# Patient Record
Sex: FEMALE | Race: WHITE | NOT HISPANIC OR LATINO | Employment: FULL TIME | URBAN - METROPOLITAN AREA
[De-identification: names, ages, dates, MRNs, and addresses within clinical notes are randomized per-mention and may not be internally consistent; named-entity substitution may affect disease eponyms.]

---

## 2017-01-24 ENCOUNTER — OFFICE VISIT (OUTPATIENT)
Dept: FAMILY MEDICINE CLINIC | Facility: CLINIC | Age: 57
End: 2017-01-24

## 2017-01-24 VITALS
SYSTOLIC BLOOD PRESSURE: 126 MMHG | OXYGEN SATURATION: 99 % | HEART RATE: 66 BPM | BODY MASS INDEX: 26.84 KG/M2 | TEMPERATURE: 97.9 F | WEIGHT: 171 LBS | DIASTOLIC BLOOD PRESSURE: 80 MMHG | HEIGHT: 67 IN

## 2017-01-24 DIAGNOSIS — J45.30 MILD PERSISTENT ASTHMA WITHOUT COMPLICATION: ICD-10-CM

## 2017-01-24 PROCEDURE — 99213 OFFICE O/P EST LOW 20 MIN: CPT | Performed by: NURSE PRACTITIONER

## 2017-01-24 RX ORDER — ALBUTEROL SULFATE 90 UG/1
2 AEROSOL, METERED RESPIRATORY (INHALATION) EVERY 4 HOURS PRN
Qty: 1 INHALER | Refills: 11 | Status: SHIPPED | OUTPATIENT
Start: 2017-01-24 | End: 2018-01-17 | Stop reason: SDUPTHER

## 2017-01-24 RX ORDER — MONTELUKAST SODIUM 10 MG/1
TABLET ORAL
Qty: 30 TABLET | Refills: 11 | Status: SHIPPED | OUTPATIENT
Start: 2017-01-24 | End: 2017-06-26

## 2017-01-24 NOTE — PROGRESS NOTES
Subjective   Cheri Cross is a 56 y.o. female.     History of Present Illness   Patient presents to office to discuss asthma medications.  Patient has previously been well controlled and used albuterol inhaler only occasionally. Patient reports she has been needing to use it daily for the past few weeks.  She reports feeling as if she is unable to get deep breath.  Reports some mild dry cough that is worse at bedtime.  Denies mucus.  She was previously taking ventolin but insurance prefers proair.    The following portions of the patient's history were reviewed and updated as appropriate: allergies, current medications, past family history, past medical history, past social history, past surgical history and problem list.    Review of Systems   Constitutional: Negative for chills and fever.   HENT: Negative for congestion, sinus pressure and sore throat.    Respiratory: Positive for cough, chest tightness and shortness of breath. Negative for wheezing.        Objective   Physical Exam   Constitutional: She is oriented to person, place, and time. She appears well-developed and well-nourished.   HENT:   Head: Normocephalic and atraumatic.   Cardiovascular: Normal rate and regular rhythm.    Pulmonary/Chest: Effort normal and breath sounds normal.   Neurological: She is alert and oriented to person, place, and time.   Skin: Skin is warm and dry.   Psychiatric: She has a normal mood and affect. Judgment normal.   Vitals reviewed.      Assessment/Plan   Cheri was seen today for asthma.    Diagnoses and all orders for this visit:    Mild persistent asthma without complication  -     albuterol (PROVENTIL HFA;VENTOLIN HFA) 108 (90 BASE) MCG/ACT inhaler; Inhale 2 puffs Every 4 (Four) Hours As Needed for wheezing.  -     montelukast (SINGULAIR) 10 MG tablet; One PO daily  -     Fluticasone Furoate-Vilanterol 100-25 MCG/INH aerosol powder ; Inhale 1 puff Daily.

## 2017-01-24 NOTE — MR AVS SNAPSHOT
Cheri Cross   1/24/2017 10:30 AM   Office Visit    Provider:  JOSHUA Trejo   Department:  [facility]   Dept Phone:  [phone]                Your Full Care Plan              Today's Medication Changes          These changes are accurate as of: 1/24/17 11:01 AM.  If you have any questions, ask your nurse or doctor.               New Medication(s)Ordered:     Fluticasone Furoate-Vilanterol 100-25 MCG/INH aerosol powder    Inhale 1 puff Daily.   Started by:  JOSHUA Trejo       montelukast 10 MG tablet   Commonly known as:  SINGULAIR   One PO daily   Started by:  JOSHUA Trejo            Where to Get Your Medications      These medications were sent to [facility] [id number] [place] [address] [id number] AT [facility] - [phone]  - [phone]   [id number], [facility] [id number]    Hours:  24-hours Phone:  [phone]     albuterol 108 (90 BASE) MCG/ACT inhaler    Fluticasone Furoate-Vilanterol 100-25 MCG/INH aerosol powder     montelukast 10 MG tablet                  Your Updated Medication List          This list is accurate as of: 1/24/17 11:01 AM.  Always use your most recent med list.                albuterol 108 (90 BASE) MCG/ACT inhaler   Commonly known as:  PROVENTIL HFA;VENTOLIN HFA   Inhale 2 puffs Every 4 (Four) Hours As Needed for wheezing.       CALCIUM 500 PO       estradiol 2 MG tablet   Commonly known as:  ESTRACE       Fluticasone Furoate-Vilanterol 100-25 MCG/INH aerosol powder    Inhale 1 puff Daily.       furosemide 20 MG tablet   Commonly known as:  LASIX   Take 1 tablet by mouth Daily.       hydrOXYzine 25 MG tablet   Commonly known as:  ATARAX   Take 1 tablet by mouth 3 (Three) Times a Day As Needed for anxiety.       meclizine 25 MG tablet   Commonly known as:  ANTIVERT       montelukast 10 MG tablet   Commonly  "known as:  SINGULAIR   One PO daily       omeprazole 40 MG capsule   Commonly known as:  PRILOSEC   Take 1 capsule by mouth Daily.       pravastatin 40 MG tablet   Commonly known as:  PRAVACHOL   Take 1 tablet by mouth Daily.               You Were Diagnosed With        Codes Comments    Mild intermittent asthma without complication     ICD-10-CM: J45.20  ICD-9-CM: 493.90       Instructions     None    Patient Instructions History      [employer] Signup     [facility]bird allows you to send messages to your doctor, view your test results, renew your prescriptions, schedule appointments, and more. To sign up, go to [employer] and click on the Sign Up Now link in the New User? box. Enter your Catbird Activation Code exactly as it appears below along with the last four digits of your Social Security Number and your Date of Birth () to complete the sign-up process. If you do not sign up before the expiration date, you must request a new code.    Catbird Activation Code: [email or web address]  Expires: 2017 11:01 AM    If you have questions, you can email [email or web address] or call [phone] to talk to our Catbird staff. Remember, Catbird is NOT to be used for urgent needs. For medical emergencies, dial 911.               Other Info from Your Visit           Allergies     Nickel        Reason for Visit     Asthma 2 wks  using inhaler more than normal.      Vital Signs     Blood Pressure Pulse Temperature Height Weight Oxygen Saturation    126/80 66 97.9 °F (36.6 °C) 67\" (170.2 cm) 171 lb (77.6 kg) 99%    Body Mass Index Smoking Status                26.78 kg/m2 Never Smoker          Problems and Diagnoses Noted     Asthma      "

## 2017-05-21 DIAGNOSIS — E78.2 MIXED HYPERLIPIDEMIA: ICD-10-CM

## 2017-05-22 RX ORDER — PRAVASTATIN SODIUM 40 MG
TABLET ORAL
Qty: 30 TABLET | Refills: 0 | Status: SHIPPED | OUTPATIENT
Start: 2017-05-22 | End: 2017-06-14 | Stop reason: SDUPTHER

## 2017-06-14 DIAGNOSIS — R60.9 EDEMA, UNSPECIFIED TYPE: ICD-10-CM

## 2017-06-14 DIAGNOSIS — E78.2 MIXED HYPERLIPIDEMIA: ICD-10-CM

## 2017-06-14 RX ORDER — FUROSEMIDE 20 MG/1
TABLET ORAL
Qty: 30 TABLET | Refills: 0 | Status: SHIPPED | OUTPATIENT
Start: 2017-06-14 | End: 2017-06-26 | Stop reason: SDUPTHER

## 2017-06-14 RX ORDER — PRAVASTATIN SODIUM 40 MG
TABLET ORAL
Qty: 30 TABLET | Refills: 0 | Status: SHIPPED | OUTPATIENT
Start: 2017-06-14 | End: 2017-06-26 | Stop reason: SDUPTHER

## 2017-06-26 ENCOUNTER — OFFICE VISIT (OUTPATIENT)
Dept: FAMILY MEDICINE CLINIC | Facility: CLINIC | Age: 57
End: 2017-06-26

## 2017-06-26 VITALS
TEMPERATURE: 98.1 F | HEIGHT: 67 IN | DIASTOLIC BLOOD PRESSURE: 70 MMHG | OXYGEN SATURATION: 98 % | HEART RATE: 83 BPM | WEIGHT: 167 LBS | BODY MASS INDEX: 26.21 KG/M2 | SYSTOLIC BLOOD PRESSURE: 114 MMHG

## 2017-06-26 DIAGNOSIS — E04.9 GOITER: Primary | ICD-10-CM

## 2017-06-26 DIAGNOSIS — E78.2 MIXED HYPERLIPIDEMIA: ICD-10-CM

## 2017-06-26 DIAGNOSIS — R60.9 EDEMA, UNSPECIFIED TYPE: ICD-10-CM

## 2017-06-26 PROCEDURE — 99214 OFFICE O/P EST MOD 30 MIN: CPT | Performed by: NURSE PRACTITIONER

## 2017-06-26 RX ORDER — PRAVASTATIN SODIUM 40 MG
40 TABLET ORAL DAILY
Qty: 90 TABLET | Refills: 1 | Status: SHIPPED | OUTPATIENT
Start: 2017-06-26 | End: 2018-01-17 | Stop reason: SDUPTHER

## 2017-06-26 RX ORDER — FUROSEMIDE 20 MG/1
20 TABLET ORAL DAILY
Qty: 90 TABLET | Refills: 1 | Status: SHIPPED | OUTPATIENT
Start: 2017-06-26 | End: 2018-01-17 | Stop reason: SDUPTHER

## 2017-06-26 NOTE — PROGRESS NOTES
"Subjective   Cheri Cross is a 56 y.o. female.     History of Present Illness   Chief Complaint:   Chief Complaint   Patient presents with   • Hyperlipidemia     med refill   • Neck Pain     1 mo, tender to touch       Cheri Cross 56 y.o. female who presents today for Medical Management of the below listed issues and medication refills.  she has a history of   Patient Active Problem List   Diagnosis   • Syncope and collapse   • Ventricular tachycardia   • Asthma   • Hyperlipidemia   • Hypothyroid   .  Since the last visit, she has overall felt well.  she has been compliant with   Current Outpatient Prescriptions:   •  furosemide (LASIX) 20 MG tablet, Take 1 tablet by mouth Daily., Disp: 90 tablet, Rfl: 1  •  pravastatin (PRAVACHOL) 40 MG tablet, Take 1 tablet by mouth Daily., Disp: 90 tablet, Rfl: 1  •  albuterol (PROVENTIL HFA;VENTOLIN HFA) 108 (90 BASE) MCG/ACT inhaler, Inhale 2 puffs Every 4 (Four) Hours As Needed for wheezing., Disp: 1 inhaler, Rfl: 11  •  Calcium-Magnesium-Vitamin D (CALCIUM 500 PO), Take 600 mg by mouth 3 (three) times a day., Disp: , Rfl:   •  estradiol (ESTRACE) 2 MG tablet, , Disp: , Rfl: 3.  she denies medication side effects.    All of the chronic condition(s) listed above are stable w/o issues.    /70  Pulse 83  Temp 98.1 °F (36.7 °C)  Ht 67\" (170.2 cm)  Wt 167 lb (75.8 kg)  SpO2 98%  BMI 26.16 kg/m2    Results for orders placed or performed in visit on 10/20/16   Comprehensive metabolic panel   Result Value Ref Range    Glucose 97 65 - 99 mg/dL    BUN 15 6 - 20 mg/dL    Creatinine 0.72 0.57 - 1.00 mg/dL    eGFR Non African Am 84 >60 mL/min/1.73    eGFR African Am 102 >60 mL/min/1.73    BUN/Creatinine Ratio 20.8 7.0 - 25.0    Sodium 141 136 - 145 mmol/L    Potassium 4.8 3.5 - 5.2 mmol/L    Chloride 101 98 - 107 mmol/L    Total CO2 26.8 22.0 - 29.0 mmol/L    Calcium 9.5 8.6 - 10.5 mg/dL    Total Protein 6.5 6.0 - 8.5 g/dL    Albumin 4.20 3.50 - 5.20 g/dL    Globulin 2.3 " gm/dL    A/G Ratio 1.8 g/dL    Total Bilirubin 0.4 0.1 - 1.2 mg/dL    Alkaline Phosphatase 103 39 - 117 U/L    AST (SGOT) 15 1 - 32 U/L    ALT (SGPT) 15 1 - 33 U/L   Lipid Panel With LDL/HDL Ratio   Result Value Ref Range    Total Cholesterol 226 (H) 0 - 200 mg/dL    Triglycerides 91 0 - 150 mg/dL    HDL Cholesterol 80 (H) 40 - 60 mg/dL    VLDL Cholesterol 18.2 5 - 40 mg/dL    LDL Cholesterol  128 (H) 0 - 100 mg/dL    LDL/HDL Ratio 1.60    TSH   Result Value Ref Range    TSH 2.800 0.270 - 4.200 mIU/mL   CBC and Differential   Result Value Ref Range    WBC 4.69 4.50 - 10.70 10*3/mm3    RBC 4.56 3.90 - 5.20 10*6/mm3    Hemoglobin 13.6 11.9 - 15.5 g/dL    Hematocrit 42.2 35.6 - 45.5 %    MCV 92.5 80.5 - 98.2 fL    MCH 29.8 26.9 - 32.0 pg    MCHC 32.2 (L) 32.4 - 36.3 g/dL    RDW 13.1 (H) 11.7 - 13.0 %    Platelets 258 140 - 500 10*3/mm3    Neutrophil Rel % 48.2 42.7 - 76.0 %    Lymphocyte Rel % 44.3 19.6 - 45.3 %    Monocyte Rel % 7.5 5.0 - 12.0 %    Eosinophil Rel % 0.0 (L) 0.3 - 6.2 %    Basophil Rel % 0.0 0.0 - 1.5 %    Neutrophils Absolute 2.26 1.90 - 8.10 10*3/mm3    Lymphocytes Absolute 2.08 0.90 - 4.80 10*3/mm3    Monocytes Absolute 0.35 0.20 - 1.20 10*3/mm3    Eosinophils Absolute 0.00 0.00 - 0.70 10*3/mm3    Basophils Absolute 0.00 0.00 - 0.20 10*3/mm3    Immature Granulocyte Rel % 0.0 0.0 - 0.5 %    Immature Grans Absolute 0.00 0.00 - 0.03 10*3/mm3     Patient also reports some swelling and tenderness to anterior neck area.  Has noticed symptoms for about a month.  Denies injury or stiffness to neck.  Denies URI symptoms.   The following portions of the patient's history were reviewed and updated as appropriate: allergies, current medications, past family history, past medical history, past social history, past surgical history and problem list.    Review of Systems   Constitutional: Negative for unexpected weight change.   Respiratory: Negative for shortness of breath.    Cardiovascular: Negative for chest  pain and palpitations.   Psychiatric/Behavioral: Negative for behavioral problems.       Objective   Physical Exam   Constitutional: She is oriented to person, place, and time. She appears well-developed and well-nourished.   HENT:   Right Ear: Tympanic membrane, external ear and ear canal normal.   Left Ear: Tympanic membrane, external ear and ear canal normal.   Nose: Right sinus exhibits no maxillary sinus tenderness and no frontal sinus tenderness. Left sinus exhibits no maxillary sinus tenderness and no frontal sinus tenderness.   Mouth/Throat: Uvula is midline and oropharynx is clear and moist.   Neck: Carotid bruit is not present. Thyromegaly present.   Cardiovascular: Normal rate and regular rhythm.    Pulmonary/Chest: Effort normal and breath sounds normal.   Neurological: She is alert and oriented to person, place, and time.   Skin: Skin is warm.   Psychiatric: She has a normal mood and affect. Judgment normal.   Nursing note and vitals reviewed.      Assessment/Plan   Cheri was seen today for hyperlipidemia and neck pain.    Diagnoses and all orders for this visit:    Goiter  -     Ambulatory Referral to ENT (Otolaryngology)  -     T4  -     TSH  -     T3  -     US thyroid    Edema, unspecified type  -     furosemide (LASIX) 20 MG tablet; Take 1 tablet by mouth Daily.    Mixed hyperlipidemia  -     pravastatin (PRAVACHOL) 40 MG tablet; Take 1 tablet by mouth Daily.

## 2017-06-27 LAB
T3 SERPL-MCNC: 367.5 NG/DL (ref 80–200)
T4 SERPL-MCNC: >24.9 MCG/DL (ref 4.5–11.7)
TSH SERPL DL<=0.005 MIU/L-ACNC: 0.02 MIU/ML (ref 0.27–4.2)

## 2018-01-08 DIAGNOSIS — R60.9 EDEMA, UNSPECIFIED TYPE: ICD-10-CM

## 2018-01-09 RX ORDER — FUROSEMIDE 20 MG/1
TABLET ORAL
Qty: 90 TABLET | Refills: 0 | OUTPATIENT
Start: 2018-01-09

## 2018-01-17 ENCOUNTER — OFFICE VISIT (OUTPATIENT)
Dept: FAMILY MEDICINE CLINIC | Facility: CLINIC | Age: 58
End: 2018-01-17

## 2018-01-17 VITALS
BODY MASS INDEX: 27.15 KG/M2 | HEART RATE: 63 BPM | SYSTOLIC BLOOD PRESSURE: 120 MMHG | HEIGHT: 67 IN | TEMPERATURE: 98.1 F | RESPIRATION RATE: 18 BRPM | DIASTOLIC BLOOD PRESSURE: 78 MMHG | WEIGHT: 173 LBS | OXYGEN SATURATION: 97 %

## 2018-01-17 DIAGNOSIS — Z12.11 ENCOUNTER FOR COLORECTAL CANCER SCREENING: ICD-10-CM

## 2018-01-17 DIAGNOSIS — R60.9 EDEMA, UNSPECIFIED TYPE: ICD-10-CM

## 2018-01-17 DIAGNOSIS — Z12.12 ENCOUNTER FOR COLORECTAL CANCER SCREENING: ICD-10-CM

## 2018-01-17 DIAGNOSIS — J45.30 MILD PERSISTENT ASTHMA WITHOUT COMPLICATION: ICD-10-CM

## 2018-01-17 DIAGNOSIS — E78.2 MIXED HYPERLIPIDEMIA: Primary | ICD-10-CM

## 2018-01-17 LAB
ALBUMIN SERPL-MCNC: 4.1 G/DL (ref 3.5–5.2)
ALBUMIN/GLOB SERPL: 1.3 G/DL
ALP SERPL-CCNC: 113 U/L (ref 39–117)
ALT SERPL-CCNC: 14 U/L (ref 1–33)
AST SERPL-CCNC: 16 U/L (ref 1–32)
BASOPHILS # BLD AUTO: 0.02 10*3/MM3 (ref 0–0.2)
BASOPHILS NFR BLD AUTO: 0.3 % (ref 0–1.5)
BILIRUB SERPL-MCNC: 0.3 MG/DL (ref 0.1–1.2)
BUN SERPL-MCNC: 21 MG/DL (ref 6–20)
BUN/CREAT SERPL: 25.6 (ref 7–25)
CALCIUM SERPL-MCNC: 9.2 MG/DL (ref 8.6–10.5)
CHLORIDE SERPL-SCNC: 100 MMOL/L (ref 98–107)
CHOLEST SERPL-MCNC: 253 MG/DL (ref 0–200)
CO2 SERPL-SCNC: 30.4 MMOL/L (ref 22–29)
CREAT SERPL-MCNC: 0.82 MG/DL (ref 0.57–1)
EOSINOPHIL # BLD AUTO: 0 10*3/MM3 (ref 0–0.7)
EOSINOPHIL NFR BLD AUTO: 0 % (ref 0.3–6.2)
ERYTHROCYTE [DISTWIDTH] IN BLOOD BY AUTOMATED COUNT: 12.9 % (ref 11.7–13)
GLOBULIN SER CALC-MCNC: 3.1 GM/DL
GLUCOSE SERPL-MCNC: 87 MG/DL (ref 65–99)
HCT VFR BLD AUTO: 42.7 % (ref 35.6–45.5)
HDLC SERPL-MCNC: 83 MG/DL (ref 40–60)
HGB BLD-MCNC: 13.8 G/DL (ref 11.9–15.5)
IMM GRANULOCYTES # BLD: 0 10*3/MM3 (ref 0–0.03)
IMM GRANULOCYTES NFR BLD: 0 % (ref 0–0.5)
LDLC SERPL CALC-MCNC: 145 MG/DL (ref 0–100)
LYMPHOCYTES # BLD AUTO: 2.88 10*3/MM3 (ref 0.9–4.8)
LYMPHOCYTES NFR BLD AUTO: 41.3 % (ref 19.6–45.3)
MCH RBC QN AUTO: 30.1 PG (ref 26.9–32)
MCHC RBC AUTO-ENTMCNC: 32.3 G/DL (ref 32.4–36.3)
MCV RBC AUTO: 93 FL (ref 80.5–98.2)
MONOCYTES # BLD AUTO: 0.35 10*3/MM3 (ref 0.2–1.2)
MONOCYTES NFR BLD AUTO: 5 % (ref 5–12)
NEUTROPHILS # BLD AUTO: 3.72 10*3/MM3 (ref 1.9–8.1)
NEUTROPHILS NFR BLD AUTO: 53.4 % (ref 42.7–76)
PLATELET # BLD AUTO: 282 10*3/MM3 (ref 140–500)
POTASSIUM SERPL-SCNC: 4.1 MMOL/L (ref 3.5–5.2)
PROT SERPL-MCNC: 7.2 G/DL (ref 6–8.5)
RBC # BLD AUTO: 4.59 10*6/MM3 (ref 3.9–5.2)
SODIUM SERPL-SCNC: 141 MMOL/L (ref 136–145)
TRIGL SERPL-MCNC: 126 MG/DL (ref 0–150)
VLDLC SERPL CALC-MCNC: 25.2 MG/DL (ref 5–40)
WBC # BLD AUTO: 6.97 10*3/MM3 (ref 4.5–10.7)

## 2018-01-17 PROCEDURE — 99214 OFFICE O/P EST MOD 30 MIN: CPT | Performed by: NURSE PRACTITIONER

## 2018-01-17 RX ORDER — FUROSEMIDE 20 MG/1
20 TABLET ORAL DAILY
Qty: 90 TABLET | Refills: 1 | Status: SHIPPED | OUTPATIENT
Start: 2018-01-17 | End: 2018-07-13 | Stop reason: SDUPTHER

## 2018-01-17 RX ORDER — PRAVASTATIN SODIUM 40 MG
40 TABLET ORAL DAILY
Qty: 90 TABLET | Refills: 1 | Status: SHIPPED | OUTPATIENT
Start: 2018-01-17 | End: 2018-07-13 | Stop reason: SDUPTHER

## 2018-01-17 RX ORDER — PROPRANOLOL HYDROCHLORIDE 40 MG/1
TABLET ORAL
Refills: 3 | COMMUNITY
Start: 2017-12-18 | End: 2019-07-25

## 2018-01-17 RX ORDER — METHIMAZOLE 5 MG/1
TABLET ORAL
Refills: 3 | COMMUNITY
Start: 2018-01-08 | End: 2019-07-25

## 2018-01-17 RX ORDER — METHIMAZOLE 10 MG/1
TABLET ORAL
Refills: 3 | COMMUNITY
Start: 2017-12-18 | End: 2018-01-17 | Stop reason: ALTCHOICE

## 2018-01-17 RX ORDER — ALBUTEROL SULFATE 90 UG/1
2 AEROSOL, METERED RESPIRATORY (INHALATION) EVERY 4 HOURS PRN
Qty: 1 INHALER | Refills: 11 | Status: SHIPPED | OUTPATIENT
Start: 2018-01-17 | End: 2020-05-01 | Stop reason: SDUPTHER

## 2018-01-17 NOTE — PROGRESS NOTES
Subjective   Cheri Cross is a 57 y.o. female.     History of Present Illness   Cheri Cross 57 y.o. female who presents today for routine follow up check and medication refills.  she has a history of   Patient Active Problem List   Diagnosis   • Syncope and collapse   • Ventricular tachycardia   • Asthma   • Hyperlipidemia   • Hyperthyroidism   .  Since the last visit, she has overall felt well.  She has Hyperlipidemia and here to discuss treatment and Hypothyroidism and will need to update labs for continued treatment.  she has been compliant with current medications have reviewed them.  The patient denies medication side effects.    Results for orders placed or performed in visit on 06/26/17   T4   Result Value Ref Range    T4, Total >24.90 (H) 4.50 - 11.70 mcg/dL   TSH   Result Value Ref Range    TSH 0.017 (L) 0.270 - 4.200 mIU/mL   T3   Result Value Ref Range    T3, Total 367.5 (H) 80.0 - 200.0 ng/dL     Patient is seeing Dr. Beckwith for hyperthyroidism. Is taking tapazole 2.5 mg BID.  Is due to f/u 2/21 for repeat labs.  Medication was adjusted a week ago.     Reports rarely using albuterol inhaler.    The following portions of the patient's history were reviewed and updated as appropriate: allergies, current medications, past family history, past medical history, past social history, past surgical history and problem list.    Review of Systems   Constitutional: Negative for fatigue and unexpected weight change.   Respiratory: Negative for shortness of breath.    Cardiovascular: Negative for chest pain and palpitations.   Endocrine: Negative for cold intolerance, heat intolerance, polydipsia, polyphagia and polyuria.   Psychiatric/Behavioral: Negative for behavioral problems.       Objective   Physical Exam   Constitutional: She is oriented to person, place, and time. She appears well-developed and well-nourished.   Neck: Carotid bruit is not present.   Cardiovascular: Normal rate and regular rhythm.     Pulmonary/Chest: Effort normal and breath sounds normal.   Neurological: She is alert and oriented to person, place, and time.   Psychiatric: She has a normal mood and affect. Judgment normal.   Nursing note and vitals reviewed.      Assessment/Plan   Cheri was seen today for hyperlipidemia and asthma.    Diagnoses and all orders for this visit:    Mixed hyperlipidemia  -     Comprehensive metabolic panel  -     Lipid panel  -     CBC and Differential  -     pravastatin (PRAVACHOL) 40 MG tablet; Take 1 tablet by mouth Daily.    Mild persistent asthma without complication  -     albuterol (PROVENTIL HFA;VENTOLIN HFA) 108 (90 Base) MCG/ACT inhaler; Inhale 2 puffs Every 4 (Four) Hours As Needed for Wheezing.    Edema, unspecified type  -     furosemide (LASIX) 20 MG tablet; Take 1 tablet by mouth Daily.    Encounter for colorectal cancer screening  -     Cologuard - Stool, Per Rectum

## 2018-03-07 DIAGNOSIS — F41.9 ANXIETY: ICD-10-CM

## 2018-03-08 RX ORDER — HYDROXYZINE HYDROCHLORIDE 25 MG/1
25 TABLET, FILM COATED ORAL 3 TIMES DAILY PRN
Qty: 90 TABLET | Refills: 0 | Status: SHIPPED | OUTPATIENT
Start: 2018-03-08 | End: 2019-07-25

## 2018-07-13 DIAGNOSIS — E78.2 MIXED HYPERLIPIDEMIA: ICD-10-CM

## 2018-07-13 DIAGNOSIS — R60.9 EDEMA, UNSPECIFIED TYPE: ICD-10-CM

## 2018-07-16 RX ORDER — FUROSEMIDE 20 MG/1
20 TABLET ORAL DAILY
Qty: 30 TABLET | Refills: 0 | Status: SHIPPED | OUTPATIENT
Start: 2018-07-16 | End: 2018-07-24 | Stop reason: SDUPTHER

## 2018-07-16 RX ORDER — PRAVASTATIN SODIUM 40 MG
40 TABLET ORAL DAILY
Qty: 30 TABLET | Refills: 0 | Status: SHIPPED | OUTPATIENT
Start: 2018-07-16 | End: 2018-07-24 | Stop reason: SDUPTHER

## 2018-07-24 ENCOUNTER — OFFICE VISIT (OUTPATIENT)
Dept: FAMILY MEDICINE CLINIC | Facility: CLINIC | Age: 58
End: 2018-07-24

## 2018-07-24 VITALS
HEIGHT: 67 IN | WEIGHT: 173 LBS | OXYGEN SATURATION: 97 % | HEART RATE: 64 BPM | RESPIRATION RATE: 16 BRPM | DIASTOLIC BLOOD PRESSURE: 71 MMHG | BODY MASS INDEX: 27.15 KG/M2 | TEMPERATURE: 97.9 F | SYSTOLIC BLOOD PRESSURE: 131 MMHG

## 2018-07-24 DIAGNOSIS — E78.2 MIXED HYPERLIPIDEMIA: ICD-10-CM

## 2018-07-24 DIAGNOSIS — R60.9 EDEMA, UNSPECIFIED TYPE: ICD-10-CM

## 2018-07-24 PROCEDURE — 99214 OFFICE O/P EST MOD 30 MIN: CPT | Performed by: NURSE PRACTITIONER

## 2018-07-24 RX ORDER — FUROSEMIDE 20 MG/1
20 TABLET ORAL DAILY
Qty: 90 TABLET | Refills: 3 | Status: SHIPPED | OUTPATIENT
Start: 2018-07-24 | End: 2019-07-25 | Stop reason: SDUPTHER

## 2018-07-24 RX ORDER — PRAVASTATIN SODIUM 40 MG
40 TABLET ORAL DAILY
Qty: 90 TABLET | Refills: 3 | Status: SHIPPED | OUTPATIENT
Start: 2018-07-24 | End: 2019-07-25 | Stop reason: SDUPTHER

## 2018-07-24 RX ORDER — OMEPRAZOLE 40 MG/1
40 CAPSULE, DELAYED RELEASE ORAL DAILY
Qty: 90 CAPSULE | Refills: 1 | Status: SHIPPED | OUTPATIENT
Start: 2018-07-24 | End: 2019-05-02 | Stop reason: SDUPTHER

## 2018-07-24 NOTE — PROGRESS NOTES
Subjective   Cheri Cross is a 58 y.o. female.     History of Present Illness   Cheri Cross 58 y.o. female who presents today for routine follow up check and medication refills.  she has a history of   Patient Active Problem List   Diagnosis   • Syncope and collapse   • Ventricular tachycardia (CMS/HCC)   • Asthma   • Hyperlipidemia   • Hyperthyroidism   .  Since the last visit, she has overall felt well.  She has Hyperlipidemia and is well controlled on medication and lower extremity edema which is well controlled on lasix. .  she has been compliant with current medications have reviewed them.  The patient denies medication side effects.    Results for orders placed or performed in visit on 01/17/18   Comprehensive metabolic panel   Result Value Ref Range    Glucose 87 65 - 99 mg/dL    BUN 21 (H) 6 - 20 mg/dL    Creatinine 0.82 0.57 - 1.00 mg/dL    eGFR Non African Am 72 >60 mL/min/1.73    eGFR African Am 87 >60 mL/min/1.73    BUN/Creatinine Ratio 25.6 (H) 7.0 - 25.0    Sodium 141 136 - 145 mmol/L    Potassium 4.1 3.5 - 5.2 mmol/L    Chloride 100 98 - 107 mmol/L    Total CO2 30.4 (H) 22.0 - 29.0 mmol/L    Calcium 9.2 8.6 - 10.5 mg/dL    Total Protein 7.2 6.0 - 8.5 g/dL    Albumin 4.10 3.50 - 5.20 g/dL    Globulin 3.1 gm/dL    A/G Ratio 1.3 g/dL    Total Bilirubin 0.3 0.1 - 1.2 mg/dL    Alkaline Phosphatase 113 39 - 117 U/L    AST (SGOT) 16 1 - 32 U/L    ALT (SGPT) 14 1 - 33 U/L   Lipid panel   Result Value Ref Range    Total Cholesterol 253 (H) 0 - 200 mg/dL    Triglycerides 126 0 - 150 mg/dL    HDL Cholesterol 83 (H) 40 - 60 mg/dL    VLDL Cholesterol 25.2 5 - 40 mg/dL    LDL Cholesterol  145 (H) 0 - 100 mg/dL   CBC and Differential   Result Value Ref Range    WBC 6.97 4.50 - 10.70 10*3/mm3    RBC 4.59 3.90 - 5.20 10*6/mm3    Hemoglobin 13.8 11.9 - 15.5 g/dL    Hematocrit 42.7 35.6 - 45.5 %    MCV 93.0 80.5 - 98.2 fL    MCH 30.1 26.9 - 32.0 pg    MCHC 32.3 (L) 32.4 - 36.3 g/dL    RDW 12.9 11.7 - 13.0 %     Platelets 282 140 - 500 10*3/mm3    Neutrophil Rel % 53.4 42.7 - 76.0 %    Lymphocyte Rel % 41.3 19.6 - 45.3 %    Monocyte Rel % 5.0 5.0 - 12.0 %    Eosinophil Rel % 0.0 (L) 0.3 - 6.2 %    Basophil Rel % 0.3 0.0 - 1.5 %    Neutrophils Absolute 3.72 1.90 - 8.10 10*3/mm3    Lymphocytes Absolute 2.88 0.90 - 4.80 10*3/mm3    Monocytes Absolute 0.35 0.20 - 1.20 10*3/mm3    Eosinophils Absolute 0.00 0.00 - 0.70 10*3/mm3    Basophils Absolute 0.02 0.00 - 0.20 10*3/mm3    Immature Granulocyte Rel % 0.0 0.0 - 0.5 %    Immature Grans Absolute 0.00 0.00 - 0.03 10*3/mm3       The following portions of the patient's history were reviewed and updated as appropriate: allergies, current medications, past family history, past medical history, past social history, past surgical history and problem list.    Review of Systems   Constitutional: Negative for unexpected weight change.   Respiratory: Negative for shortness of breath.    Cardiovascular: Negative for chest pain and palpitations.   Endocrine: Negative for cold intolerance, heat intolerance, polydipsia, polyphagia and polyuria.   Psychiatric/Behavioral: Negative for behavioral problems.       Objective   Physical Exam   Constitutional: She is oriented to person, place, and time. She appears well-developed and well-nourished.   Cardiovascular: Normal rate and regular rhythm.    Pulmonary/Chest: Effort normal and breath sounds normal.   Neurological: She is alert and oriented to person, place, and time.   Psychiatric: She has a normal mood and affect. Judgment normal.   Nursing note and vitals reviewed.      Assessment/Plan   Cheri was seen today for hyperlipidemia.    Diagnoses and all orders for this visit:    Edema, unspecified type  -     furosemide (LASIX) 20 MG tablet; Take 1 tablet by mouth Daily.  -     Comprehensive metabolic panel  -     Lipid panel  -     CBC and Differential  -     TSH    Mixed hyperlipidemia  -     pravastatin (PRAVACHOL) 40 MG tablet; Take 1  tablet by mouth Daily.  -     Comprehensive metabolic panel  -     Lipid panel  -     CBC and Differential  -     TSH    Other orders  -     omeprazole (PRILOSEC) 40 MG capsule; Take 1 capsule by mouth Daily. One PO daily for GERD

## 2018-07-25 LAB
ALBUMIN SERPL-MCNC: 4 G/DL (ref 3.5–5.2)
ALBUMIN/GLOB SERPL: 1.7 G/DL
ALP SERPL-CCNC: 111 U/L (ref 39–117)
ALT SERPL-CCNC: 13 U/L (ref 1–33)
AST SERPL-CCNC: 18 U/L (ref 1–32)
BASOPHILS # BLD AUTO: 0.02 10*3/MM3 (ref 0–0.2)
BASOPHILS NFR BLD AUTO: 0.4 % (ref 0–1.5)
BILIRUB SERPL-MCNC: 0.3 MG/DL (ref 0.1–1.2)
BUN SERPL-MCNC: 17 MG/DL (ref 6–20)
BUN/CREAT SERPL: 20.5 (ref 7–25)
CALCIUM SERPL-MCNC: 9.1 MG/DL (ref 8.6–10.5)
CHLORIDE SERPL-SCNC: 102 MMOL/L (ref 98–107)
CHOLEST SERPL-MCNC: 203 MG/DL (ref 0–200)
CO2 SERPL-SCNC: 28.4 MMOL/L (ref 22–29)
CREAT SERPL-MCNC: 0.83 MG/DL (ref 0.57–1)
EOSINOPHIL # BLD AUTO: 0 10*3/MM3 (ref 0–0.7)
EOSINOPHIL NFR BLD AUTO: 0 % (ref 0.3–6.2)
ERYTHROCYTE [DISTWIDTH] IN BLOOD BY AUTOMATED COUNT: 13.7 % (ref 11.7–13)
GLOBULIN SER CALC-MCNC: 2.3 GM/DL
GLUCOSE SERPL-MCNC: 90 MG/DL (ref 65–99)
HCT VFR BLD AUTO: 43.4 % (ref 35.6–45.5)
HDLC SERPL-MCNC: 65 MG/DL (ref 40–60)
HGB BLD-MCNC: 13.2 G/DL (ref 11.9–15.5)
IMM GRANULOCYTES # BLD: 0 10*3/MM3 (ref 0–0.03)
IMM GRANULOCYTES NFR BLD: 0 % (ref 0–0.5)
LDLC SERPL CALC-MCNC: 115 MG/DL (ref 0–100)
LYMPHOCYTES # BLD AUTO: 2.18 10*3/MM3 (ref 0.9–4.8)
LYMPHOCYTES NFR BLD AUTO: 40.7 % (ref 19.6–45.3)
MCH RBC QN AUTO: 28.2 PG (ref 26.9–32)
MCHC RBC AUTO-ENTMCNC: 30.4 G/DL (ref 32.4–36.3)
MCV RBC AUTO: 92.7 FL (ref 80.5–98.2)
MONOCYTES # BLD AUTO: 0.3 10*3/MM3 (ref 0.2–1.2)
MONOCYTES NFR BLD AUTO: 5.6 % (ref 5–12)
NEUTROPHILS # BLD AUTO: 2.86 10*3/MM3 (ref 1.9–8.1)
NEUTROPHILS NFR BLD AUTO: 53.3 % (ref 42.7–76)
PLATELET # BLD AUTO: 283 10*3/MM3 (ref 140–500)
POTASSIUM SERPL-SCNC: 4.1 MMOL/L (ref 3.5–5.2)
PROT SERPL-MCNC: 6.3 G/DL (ref 6–8.5)
RBC # BLD AUTO: 4.68 10*6/MM3 (ref 3.9–5.2)
SODIUM SERPL-SCNC: 143 MMOL/L (ref 136–145)
TRIGL SERPL-MCNC: 114 MG/DL (ref 0–150)
TSH SERPL DL<=0.005 MIU/L-ACNC: 3.08 MIU/ML (ref 0.27–4.2)
VLDLC SERPL CALC-MCNC: 22.8 MG/DL (ref 5–40)
WBC # BLD AUTO: 5.36 10*3/MM3 (ref 4.5–10.7)

## 2018-09-18 ENCOUNTER — TRANSCRIBE ORDERS (OUTPATIENT)
Dept: ADMINISTRATIVE | Facility: HOSPITAL | Age: 58
End: 2018-09-18

## 2018-09-18 DIAGNOSIS — N64.4 PAIN OF LEFT BREAST: Primary | ICD-10-CM

## 2018-09-24 ENCOUNTER — HOSPITAL ENCOUNTER (OUTPATIENT)
Dept: MAMMOGRAPHY | Facility: HOSPITAL | Age: 58
Discharge: HOME | End: 2018-09-24
Attending: OBSTETRICS & GYNECOLOGY | Admitting: OBSTETRICS & GYNECOLOGY

## 2018-09-24 ENCOUNTER — HOSPITAL ENCOUNTER (OUTPATIENT)
Dept: ULTRASOUND IMAGING | Facility: HOSPITAL | Age: 58
Discharge: HOME | End: 2018-09-24

## 2018-09-24 DIAGNOSIS — N64.4 PAIN OF LEFT BREAST: ICD-10-CM

## 2018-09-24 PROCEDURE — 76641 ULTRASOUND BREAST COMPLETE: CPT

## 2018-09-24 PROCEDURE — 77066 DX MAMMO INCL CAD BI: CPT

## 2019-05-02 ENCOUNTER — TELEPHONE (OUTPATIENT)
Dept: FAMILY MEDICINE CLINIC | Facility: CLINIC | Age: 59
End: 2019-05-02

## 2019-05-02 DIAGNOSIS — K21.9 GASTROESOPHAGEAL REFLUX DISEASE, ESOPHAGITIS PRESENCE NOT SPECIFIED: Primary | ICD-10-CM

## 2019-05-02 RX ORDER — OMEPRAZOLE 40 MG/1
40 CAPSULE, DELAYED RELEASE ORAL DAILY
Qty: 90 CAPSULE | Refills: 0 | Status: SHIPPED | OUTPATIENT
Start: 2019-05-02 | End: 2019-07-25 | Stop reason: SDUPTHER

## 2019-07-25 ENCOUNTER — OFFICE VISIT (OUTPATIENT)
Dept: FAMILY MEDICINE CLINIC | Facility: CLINIC | Age: 59
End: 2019-07-25

## 2019-07-25 VITALS
BODY MASS INDEX: 27.16 KG/M2 | WEIGHT: 169 LBS | SYSTOLIC BLOOD PRESSURE: 122 MMHG | HEIGHT: 66 IN | HEART RATE: 70 BPM | OXYGEN SATURATION: 99 % | DIASTOLIC BLOOD PRESSURE: 72 MMHG | TEMPERATURE: 98.2 F | RESPIRATION RATE: 16 BRPM

## 2019-07-25 DIAGNOSIS — Z23 NEED FOR SHINGLES VACCINE: ICD-10-CM

## 2019-07-25 DIAGNOSIS — K21.9 GASTROESOPHAGEAL REFLUX DISEASE, ESOPHAGITIS PRESENCE NOT SPECIFIED: ICD-10-CM

## 2019-07-25 DIAGNOSIS — M54.50 ACUTE RIGHT-SIDED LOW BACK PAIN WITHOUT SCIATICA: ICD-10-CM

## 2019-07-25 DIAGNOSIS — R60.9 EDEMA, UNSPECIFIED TYPE: ICD-10-CM

## 2019-07-25 DIAGNOSIS — E78.2 MIXED HYPERLIPIDEMIA: Primary | ICD-10-CM

## 2019-07-25 PROCEDURE — 90750 HZV VACC RECOMBINANT IM: CPT | Performed by: NURSE PRACTITIONER

## 2019-07-25 PROCEDURE — 90471 IMMUNIZATION ADMIN: CPT | Performed by: NURSE PRACTITIONER

## 2019-07-25 PROCEDURE — 99214 OFFICE O/P EST MOD 30 MIN: CPT | Performed by: NURSE PRACTITIONER

## 2019-07-25 RX ORDER — CYCLOBENZAPRINE HCL 10 MG
10 TABLET ORAL 2 TIMES DAILY PRN
Qty: 30 TABLET | Refills: 0 | Status: SHIPPED | OUTPATIENT
Start: 2019-07-25 | End: 2019-08-09

## 2019-07-25 RX ORDER — METHYLPREDNISOLONE 4 MG/1
TABLET ORAL
Qty: 21 TABLET | Refills: 0 | Status: SHIPPED | OUTPATIENT
Start: 2019-07-25 | End: 2019-09-30

## 2019-07-25 RX ORDER — FUROSEMIDE 20 MG/1
20 TABLET ORAL DAILY
Qty: 90 TABLET | Refills: 3 | Status: SHIPPED | OUTPATIENT
Start: 2019-07-25 | End: 2020-04-28 | Stop reason: SDUPTHER

## 2019-07-25 RX ORDER — ESTRADIOL AND LEVONORGESTREL .045; .015 MG/D; MG/D
PATCH TRANSDERMAL
Refills: 11 | COMMUNITY
Start: 2019-07-02

## 2019-07-25 RX ORDER — OMEPRAZOLE 40 MG/1
40 CAPSULE, DELAYED RELEASE ORAL DAILY
Qty: 90 CAPSULE | Refills: 3 | Status: SHIPPED | OUTPATIENT
Start: 2019-07-25 | End: 2020-04-28 | Stop reason: SDUPTHER

## 2019-07-25 RX ORDER — PRAVASTATIN SODIUM 40 MG
40 TABLET ORAL DAILY
Qty: 90 TABLET | Refills: 3 | Status: SHIPPED | OUTPATIENT
Start: 2019-07-25 | End: 2020-04-28 | Stop reason: SDUPTHER

## 2019-07-25 NOTE — PROGRESS NOTES
Subjective   Cheri Cross is a 59 y.o. female.       Cheri Cross 59 y.o. female who presents today for routine follow up check and medication refills.  she has a history of   Patient Active Problem List   Diagnosis   • Syncope and collapse   • Ventricular tachycardia (CMS/HCC)   • Asthma   • Hyperlipidemia   • Hypothyroidism   • History of herpes zoster   • Menopause   .  Since the last visit, she has overall felt well until recent low back pain.  She has GERD and is well controlled on PPI medication and Hyperlipidemia and is well controlled on medication.  she has been compliant with current medications have reviewed them.  The patient denies medication side effects.    Results for orders placed or performed in visit on 07/24/18   Comprehensive metabolic panel   Result Value Ref Range    Glucose 90 65 - 99 mg/dL    BUN 17 6 - 20 mg/dL    Creatinine 0.83 0.57 - 1.00 mg/dL    eGFR Non African Am 71 >60 mL/min/1.73    eGFR African Am 86 >60 mL/min/1.73    BUN/Creatinine Ratio 20.5 7.0 - 25.0    Sodium 143 136 - 145 mmol/L    Potassium 4.1 3.5 - 5.2 mmol/L    Chloride 102 98 - 107 mmol/L    Total CO2 28.4 22.0 - 29.0 mmol/L    Calcium 9.1 8.6 - 10.5 mg/dL    Total Protein 6.3 6.0 - 8.5 g/dL    Albumin 4.00 3.50 - 5.20 g/dL    Globulin 2.3 gm/dL    A/G Ratio 1.7 g/dL    Total Bilirubin 0.3 0.1 - 1.2 mg/dL    Alkaline Phosphatase 111 39 - 117 U/L    AST (SGOT) 18 1 - 32 U/L    ALT (SGPT) 13 1 - 33 U/L   Lipid panel   Result Value Ref Range    Total Cholesterol 203 (H) 0 - 200 mg/dL    Triglycerides 114 0 - 150 mg/dL    HDL Cholesterol 65 (H) 40 - 60 mg/dL    VLDL Cholesterol 22.8 5 - 40 mg/dL    LDL Cholesterol  115 (H) 0 - 100 mg/dL   TSH   Result Value Ref Range    TSH 3.080 0.270 - 4.200 mIU/mL   CBC and Differential   Result Value Ref Range    WBC 5.36 4.50 - 10.70 10*3/mm3    RBC 4.68 3.90 - 5.20 10*6/mm3    Hemoglobin 13.2 11.9 - 15.5 g/dL    Hematocrit 43.4 35.6 - 45.5 %    MCV 92.7 80.5 - 98.2 fL    MCH  28.2 26.9 - 32.0 pg    MCHC 30.4 (L) 32.4 - 36.3 g/dL    RDW 13.7 (H) 11.7 - 13.0 %    Platelets 283 140 - 500 10*3/mm3    Neutrophil Rel % 53.3 42.7 - 76.0 %    Lymphocyte Rel % 40.7 19.6 - 45.3 %    Monocyte Rel % 5.6 5.0 - 12.0 %    Eosinophil Rel % 0.0 (L) 0.3 - 6.2 %    Basophil Rel % 0.4 0.0 - 1.5 %    Neutrophils Absolute 2.86 1.90 - 8.10 10*3/mm3    Lymphocytes Absolute 2.18 0.90 - 4.80 10*3/mm3    Monocytes Absolute 0.30 0.20 - 1.20 10*3/mm3    Eosinophils Absolute 0.00 0.00 - 0.70 10*3/mm3    Basophils Absolute 0.02 0.00 - 0.20 10*3/mm3    Immature Granulocyte Rel % 0.0 0.0 - 0.5 %    Immature Grans Absolute 0.00 0.00 - 0.03 10*3/mm3           History of Present Illness   Cheri Cross 59 y.o. female presents for evaluation and treatment of  low back pain. The patient first noted symptoms 1 week ago. It was related to bending over.  The pain intensity is moderate , and is located right side  and sacral. The pain is described as aching and sharp and occurs with standing, constantly and with rolling over in bed. The symptoms are not progressive. Symptoms are exacerbated by bending, twisting and standing. Factors which relieve the pain include NSAID's, heat, ice and repositioning. Other associated symptoms include no other symptoms. Previous history of symptoms: the patient has had a few such episodes.     The following portions of the patient's history were reviewed and updated as appropriate: allergies, current medications, past family history, past medical history, past social history, past surgical history and problem list.    Review of Systems   Constitutional: Negative for unexpected weight change.   Respiratory: Negative for shortness of breath.    Cardiovascular: Negative for chest pain and palpitations.   Musculoskeletal: Positive for back pain. Negative for gait problem.   Neurological: Negative for numbness.   Psychiatric/Behavioral: Negative for behavioral problems.       Objective   Physical  Exam   Constitutional: She is oriented to person, place, and time. She appears well-developed and well-nourished.   Neck: Carotid bruit is not present.   Cardiovascular: Normal rate and regular rhythm.   Pulmonary/Chest: Effort normal and breath sounds normal.   Musculoskeletal:        Lumbar back: She exhibits decreased range of motion and pain. She exhibits no tenderness, no swelling and no edema.        Back:    Negative straight leg raise bilaterally    Neurological: She is alert and oriented to person, place, and time.   Reflex Scores:       Patellar reflexes are 2+ on the right side and 2+ on the left side.       Achilles reflexes are 2+ on the right side and 2+ on the left side.  Psychiatric: She has a normal mood and affect. Judgment normal.   Nursing note and vitals reviewed.      Assessment/Plan   Cheri was seen today for back pain.    Diagnoses and all orders for this visit:    Mixed hyperlipidemia  -     Comprehensive metabolic panel  -     Lipid panel  -     CBC and Differential  -     TSH  -     pravastatin (PRAVACHOL) 40 MG tablet; Take 1 tablet by mouth Daily.    Gastroesophageal reflux disease, esophagitis presence not specified  -     omeprazole (PRILOSEC) 40 MG capsule; Take 1 capsule by mouth Daily. One PO daily for GERD    Edema, unspecified type  -     furosemide (LASIX) 20 MG tablet; Take 1 tablet by mouth Daily.    Need for shingles vaccine  -     Shingrix Vaccine    Acute right-sided low back pain without sciatica  -     cyclobenzaprine (FLEXERIL) 10 MG tablet; Take 1 tablet by mouth 2 (Two) Times a Day As Needed for Muscle Spasms for up to 15 days.  -     methylPREDNISolone (MEDROL, EMILIA,) 4 MG tablet; Take as directed on package instructions.          Declined physical therapy.

## 2019-08-06 LAB
ALBUMIN SERPL-MCNC: 4.1 G/DL (ref 3.5–5.2)
ALBUMIN/GLOB SERPL: 1.8 G/DL
ALP SERPL-CCNC: 108 U/L (ref 39–117)
ALT SERPL-CCNC: 15 U/L (ref 1–33)
AST SERPL-CCNC: 18 U/L (ref 1–32)
BASOPHILS # BLD AUTO: 0.03 10*3/MM3 (ref 0–0.2)
BASOPHILS NFR BLD AUTO: 0.5 % (ref 0–1.5)
BILIRUB SERPL-MCNC: 0.3 MG/DL (ref 0.2–1.2)
BUN SERPL-MCNC: 19 MG/DL (ref 6–20)
BUN/CREAT SERPL: 26.4 (ref 7–25)
CALCIUM SERPL-MCNC: 8.7 MG/DL (ref 8.6–10.5)
CHLORIDE SERPL-SCNC: 100 MMOL/L (ref 98–107)
CHOLEST SERPL-MCNC: 207 MG/DL (ref 0–200)
CO2 SERPL-SCNC: 29.2 MMOL/L (ref 22–29)
CREAT SERPL-MCNC: 0.72 MG/DL (ref 0.57–1)
EOSINOPHIL # BLD AUTO: 0 10*3/MM3 (ref 0–0.4)
EOSINOPHIL NFR BLD AUTO: 0 % (ref 0.3–6.2)
ERYTHROCYTE [DISTWIDTH] IN BLOOD BY AUTOMATED COUNT: 14.2 % (ref 12.3–15.4)
GLOBULIN SER CALC-MCNC: 2.3 GM/DL
GLUCOSE SERPL-MCNC: 96 MG/DL (ref 65–99)
HCT VFR BLD AUTO: 41.7 % (ref 34–46.6)
HDLC SERPL-MCNC: 64 MG/DL (ref 40–60)
HGB BLD-MCNC: 12.5 G/DL (ref 12–15.9)
IMM GRANULOCYTES # BLD AUTO: 0.02 10*3/MM3 (ref 0–0.05)
IMM GRANULOCYTES NFR BLD AUTO: 0.3 % (ref 0–0.5)
LDLC SERPL CALC-MCNC: 130 MG/DL (ref 0–100)
LYMPHOCYTES # BLD AUTO: 2.31 10*3/MM3 (ref 0.7–3.1)
LYMPHOCYTES NFR BLD AUTO: 35.6 % (ref 19.6–45.3)
MCH RBC QN AUTO: 27.7 PG (ref 26.6–33)
MCHC RBC AUTO-ENTMCNC: 30 G/DL (ref 31.5–35.7)
MCV RBC AUTO: 92.3 FL (ref 79–97)
MONOCYTES # BLD AUTO: 0.42 10*3/MM3 (ref 0.1–0.9)
MONOCYTES NFR BLD AUTO: 6.5 % (ref 5–12)
NEUTROPHILS # BLD AUTO: 3.71 10*3/MM3 (ref 1.7–7)
NEUTROPHILS NFR BLD AUTO: 57.1 % (ref 42.7–76)
NRBC BLD AUTO-RTO: 0 /100 WBC (ref 0–0.2)
PLATELET # BLD AUTO: 311 10*3/MM3 (ref 140–450)
POTASSIUM SERPL-SCNC: 4 MMOL/L (ref 3.5–5.2)
PROT SERPL-MCNC: 6.4 G/DL (ref 6–8.5)
RBC # BLD AUTO: 4.52 10*6/MM3 (ref 3.77–5.28)
SODIUM SERPL-SCNC: 142 MMOL/L (ref 136–145)
TRIGL SERPL-MCNC: 65 MG/DL (ref 0–150)
TSH SERPL DL<=0.005 MIU/L-ACNC: 2.55 MIU/ML (ref 0.27–4.2)
VLDLC SERPL CALC-MCNC: 13 MG/DL
WBC # BLD AUTO: 6.49 10*3/MM3 (ref 3.4–10.8)

## 2019-09-18 ENCOUNTER — TELEPHONE (OUTPATIENT)
Dept: FAMILY MEDICINE CLINIC | Facility: CLINIC | Age: 59
End: 2019-09-18

## 2019-09-18 DIAGNOSIS — F41.9 ANXIETY: ICD-10-CM

## 2019-09-18 RX ORDER — HYDROXYZINE HYDROCHLORIDE 25 MG/1
25 TABLET, FILM COATED ORAL 3 TIMES DAILY PRN
Qty: 90 TABLET | Refills: 1 | Status: SHIPPED | OUTPATIENT
Start: 2019-09-18 | End: 2020-04-28 | Stop reason: SDUPTHER

## 2019-09-30 ENCOUNTER — OFFICE VISIT (OUTPATIENT)
Dept: FAMILY MEDICINE CLINIC | Facility: CLINIC | Age: 59
End: 2019-09-30

## 2019-09-30 VITALS
BODY MASS INDEX: 27.8 KG/M2 | RESPIRATION RATE: 16 BRPM | HEIGHT: 66 IN | DIASTOLIC BLOOD PRESSURE: 74 MMHG | TEMPERATURE: 98.4 F | HEART RATE: 78 BPM | SYSTOLIC BLOOD PRESSURE: 130 MMHG | WEIGHT: 173 LBS

## 2019-09-30 DIAGNOSIS — M71.30 SYNOVIAL CYST: Primary | ICD-10-CM

## 2019-09-30 DIAGNOSIS — M72.0 DUPUYTREN CONTRACTURE: ICD-10-CM

## 2019-09-30 DIAGNOSIS — Z23 IMMUNIZATION DUE: ICD-10-CM

## 2019-09-30 PROCEDURE — 90750 HZV VACC RECOMBINANT IM: CPT | Performed by: FAMILY MEDICINE

## 2019-09-30 PROCEDURE — 90471 IMMUNIZATION ADMIN: CPT | Performed by: FAMILY MEDICINE

## 2019-09-30 PROCEDURE — 99213 OFFICE O/P EST LOW 20 MIN: CPT | Performed by: FAMILY MEDICINE

## 2019-09-30 NOTE — PROGRESS NOTES
Cardiac Electrophysiology Clinic       HPI:   Gallito Farley is a 85 year old [place] male with a history of complete heart block s/p dual chamber PPM implantation in 1/2017, hypertension, COPD, and prior TB right upper lobectomy who presents for follow-up. He was last seen in our clinic by Sarah Cooley on 4/19/18. History obtained with the assistance of a [place] .    Patient notes that there have been no significant changes in his health over the last year.  Endorses chronic shortness of breath and substernal chest discomfort that is always present.  He notes that he has had shortness of breath and chest discomfort for years.  The chest pain and shortness of breath can occur all the time and does not worsen with activity.  Denies palpitations and loss of consciousness.  Denies lower extremity edema.  At baseline, his normal activity is some ambulation around the house with a cane/walker.  Denies fevers, chills, abdominal pain, hematochezia, melena,.  Endorses having dysuria.  Notes having frequent constipation.      Past Medical History:   Diagnosis Date     Asthma      BPH (benign prostatic hyperplasia)      COPD (chronic obstructive pulmonary disease) (H)      Diastolic dysfunction, left ventricle 4/16/2013     H/O tuberculosis     s/p partial pneumonectomy in [place] in the 1990's     Hypertension      LBBB (left bundle branch block)      Leg wound, right     chronic, s/p grafting     Osteoarthritis        Past Surgical History:   Procedure Laterality Date     CHOLECYSTECTOMY       ENT SURGERY       IRRIGATION AND DEBRIDEMENT HIP, COMBINED  4/18/2013    Procedure: COMBINED IRRIGATION AND DEBRIDEMENT HIP;  Irrigation and debridement of Right Hip with cultures;  Surgeon: Cristal Inman MD;  Location: [facility] OR     Partial pneumonectomy      done in [place] in the 1990's     skin grafting - leg wound  6/2016       Family History   Problem Relation Age of Onset     Family History Negative  "Subjective   Cheri Cross is a 59 y.o. female.     CC: Hand Nodule          Immunizations    History of Present Illness     Pt of Jacquelin's comes to see me today c/o a roughly 2 month h/o a nodule on the index finger of the left hand. Hurts to push on it. Pt also has a several month h/o left palm nodule for about the same duration.      The following portions of the patient's history were reviewed and updated as appropriate: allergies, current medications, past family history, past medical history, past social history, past surgical history and problem list.    Review of Systems   Constitutional: Negative for chills and fever.   Skin:        Finger nodule       /74   Pulse 78   Temp 98.4 °F (36.9 °C) (Oral)   Resp 16   Ht 167.6 cm (66\")   Wt 78.5 kg (173 lb)   BMI 27.92 kg/m²     Objective   Physical Exam   Constitutional: She appears well-developed and well-nourished.   Skin:   Small Synovial cyst left index at the DIP, along with an early Dupuytren's Contracture of the 3rd digit.   Psychiatric: She has a normal mood and affect.       Assessment/Plan   Cheri was seen today for nodule left hand index finger and immunizations.    Diagnoses and all orders for this visit:    Synovial cyst  -     Ambulatory Referral to Hand Surgery    Dupuytren contracture  -     Ambulatory Referral to Hand Surgery    Immunization due  -     Shingrix Vaccine               " Mother        Social History     Tobacco Use     Smoking status: Former Smoker     Smokeless tobacco: Never Used   Substance Use Topics     Alcohol use: No         Current Outpatient Medications   Medication Sig     acetaminophen (TYLENOL) 325 MG tablet Take 2 tablets (650 mg) by mouth 3 times daily as needed for pain     albuterol (2.5 MG/3ML) 0.083% neb solution Take 1 vial (2.5 mg) by nebulization once for 1 dose     albuterol (2.5 MG/3ML) 0.083% neb solution Take 1 vial (2.5 mg) by nebulization every 6 hours as needed     amLODIPine (NORVASC) 10 MG tablet TAKE 1 TABLET BY MOUTH DAILY     BREO ELLIPTA 200-25 MCG/INH Inhaler INHALE 1 PUFF BY MOUTH DAILY     darifenacin (ENABLEX) 15 MG 24 hr tablet Take 1 tablet (15 mg) by mouth daily     diclofenac sodium 3 % GEL Apply 4 gm four times a day as needed to right trapezius muscle.     enalapril (VASOTEC) 10 MG tablet TAKE 1 TABLET BY MOUTH DAILY     finasteride (PROSCAR) 5 MG tablet TAKE 1 TABLET BY MOUTH EVERY NIGHT AT BEDTIME     hydrochlorothiazide (MICROZIDE) 12.5 MG capsule TAKE 2 CAPSULES (25MG) BY MOUTH DAILY     magnesium hydroxide (MILK OF MAGNESIA) 400 MG/5ML suspension Take 30 mLs by mouth daily as needed for constipation     mirabegron (MYRBETRIQ) 50 MG 24 hr tablet Take 1 tablet (50 mg) by mouth daily     MUCINEX 600 MG 12 hr tablet TAKE 1 TABLET BY MOUTH TWICE A DAY     order for DME Equipment being ordered: Nebulizer machine and adult tubing     order for DME Equipment being ordered: Dispense Coban wrap.     phenazopyridine (PYRIDIUM) 100 MG tablet Take 1 tablet (100 mg) by mouth 3 times daily as needed for urinary tract discomfort     polyethylene glycol (MIRALAX) powder Take 17 g (1 capful) by mouth daily     polyethylene glycol (MIRALAX/GLYCOLAX) packet Take 17 g by mouth daily     ranitidine (ZANTAC) 150 MG tablet Take 1 tablet (150 mg) by mouth 2 times daily     SPIRIVA HANDIHALER 18 MCG capsule INHALE CONTENTS OF ONE CAPSULE DAILY USING HANDIHALER  "DEVICE     tamsulosin (FLOMAX) 0.4 MG capsule TAKE 2 CAPSULES (0.8MG) BY MOUTH EVERY NIGHT AT BEDTIME     tolterodine (DETROL LA) 4 MG 24 hr capsule Take 1 capsule (4 mg) by mouth daily     tolterodine ER (DETROL LA) 4 MG 24 hr capsule Take 1 capsule (4 mg) by mouth daily     trolamine salicylate (ASPERCREME) 10 % cream Apply topically as needed for moderate pain     gabapentin (NEURONTIN) 100 MG capsule TAKE 1 CAPSULE BY MOUTH THREE TIMES A DAY (Patient not taking: Reported on 5/7/2019)     No current facility-administered medications for this visit.      Facility-Administered Medications Ordered in Other Visits   Medication     iopamidol (ISOVUE-370) solution 111 mL         ROS:   10 point ROS negative other than what is discussed above    EXAM:   /74 (BP Location: Right arm, Patient Position: Chair, Cuff Size: Adult Regular)   Pulse 94   Ht 1.8 m (5' 10.87\")   Wt 80 kg (176 lb 5.9 oz)   SpO2 94%   BMI 24.69 kg/m     GENERAL: Alert, oriented, NAD  HEENT:  NC/AT.  Sclerae white.  MMM, no oral lesions  NECK: JVP ~5 cm  HEART: Distant heart sounds, RRR, normal S1 and S2, no murmurs   LUNGS:  Clear to auscultation bilaterally, no wheezes, rales, or rhonchi  ABDOMEN: Soft, nontender, nondistended, bowel sounds present, no ascites.  EXTREMITIES: Trace nonpitting bilateral lower extremity edema.  NEURO: Unsteady in feet      Electrocardiogram: A-sensed, V-paced    Device interrogation 5/7/2019:  Pt seen in the [facility] for evaluation and iterative programming of a [employer]onic, dual lead pacemaker, per MD orders. He also has an appointment with Dr. Garcia. Today his intrinsic rhythm is Sinus 82 with CHB- ventricular rate is <30 bpm. Normal pacemaker function. No arrhythmias have been recorded. No short v-v intervals recorded. Lead trends appear stable. AP= 14% and = 100%. Pt reports he is feeling well. Battery estimates 7 years to [facility]. Plan for pt to send a remote transmission in 3 months and RTC in 6 months. JAI " Lenny Wright lead pacemakerI have reviewed and interpreted the device interrogation, settings, programming and nurseâ  s summary. The device is functioning within normal device parameters. I agree with the current findings, assessment and plan.    Echocardiogram 1/12/17:  Interpretation Summary  Global and regional left ventricular function is normal with an EF of 55-60%.  Mild diffuse hypokinesis is present.  Right ventricular function, chamber size, wall motion, and thickness are  normal.  Pulmonary artery systolic pressure is normal.  Dilation of the inferior vena cava is present with normal respiratory  variation in diameter. Estimated mean right atrial pressure is 3-8 mmHg.  No pericardial effusion is present.      Assessment and Plan:   Gallito Farley is a 85 year old [place] male with a history of complete heart block s/p dual chamber PPM implantation in 1/2017, hypertension, COPD, and prior TB right upper lobectomy who presents for follow-up.    Atypical chest discomfort  Endorses chronic shortness of breath and substernal chest discomfort that is always present. Does not sounds like typical angina. TTE in 2017 showed EF 55-60% and mild diffuse hypokinesis, normal RV. Will obtain noninvasive ischemic evaluation for risk stratification.  - Will obtain Lexiscan    CHB s/p dual chamber PPM in 2017  Has normal device function. KX=050%. BE 7 years. Continue regular device interrogations.    HTN  BP currently well controlled. Continue amlodipine 10 mg, enalapril 10 mg, and hydrochlorothiazide 25 mg.      RTC in 1 year to see Sarah Cooley or Dr. Huang.    Patient seen and discussed with Dr. Garcia.      Svitlana Fernandez MD, PhD  Cardiology Fellow  [phone]    ELECTROPHYSIOLOGY STAFF  Patient seen and examined by me.  History and physical examination discussed with Dr. Fernandez whose note reflects our joint assessment and recommendation/plans.  Gallito Farley is an 85-year old gentleman scheduled as a  "return patient although we have never seen each other before. He has seen Dr. Garsia and Dr. Huang on the inpatient Cardiology service. He was hospitalized January 2017 for complete heart block and hypertensive urgency. He had been off his usual medications for 6 months. He had a dual chamber PPM (MRI conditional) placed by Dr. Huang on 1/12/2017. His last check in November 2018 showed normal function with 3 episodes of AT/AF each < 1 minute in duration. Medications include amlodipine 10 mg daily, enalapril 10 mg daily, HCTZ 12.5 mg daily.  Other medical problems include COPD, prior TB with right upper lobectomy.  We are seeing him with his niece and an .  He gives a history of chronic chest pain and dyspnea that is \"always\" present and has been for years.  He denies palpitations, syncope, presyncope or peripheral edema.  Echo in 2017 showed normal LV systolic function, EF 55-60% with mild diffuse hypokinesis. ECG today shows sinus rhythm with ventricular pacing. Pacemaker evaluation showed normal function and no detected arrhythmias.  We will arrange a Lexiscan to evaluate his chest pain and to assess LV function with pacing.  He will continue standard follow-up in the device clinic.  Unless he has problems sooner or the Lexiscan indicates otherwise, we will arrange follow-up in one year with either Dr. Huang or Sarah Cooley (it is not clear why Mr. Farley was scheduled into our clinic, but I am retiring at the end of next month).  It was a pleasure meeting Gallito Farley today.  Lisandro Garcia        CC  Patient Care Team:  Hakeem Awad MD as PCP - General (Internal Medicine)  Miguelito, MD Ryan Chen Joseph, DPM (Podiatry)  Hakeem Awad MD as Assigned PCP  Jorge David, RN as Lead Care Coordinator  Lakesha Cheek as Other (see comments)  Radhika Rothman as Other (see comments)  Santosh Matthew MD as MD (Urology)  Kristal Delgado, RN as Registered " [facility] [place]

## 2020-04-28 ENCOUNTER — TELEMEDICINE (OUTPATIENT)
Dept: FAMILY MEDICINE CLINIC | Facility: CLINIC | Age: 60
End: 2020-04-28

## 2020-04-28 DIAGNOSIS — R60.9 EDEMA, UNSPECIFIED TYPE: ICD-10-CM

## 2020-04-28 DIAGNOSIS — E78.2 MIXED HYPERLIPIDEMIA: ICD-10-CM

## 2020-04-28 DIAGNOSIS — F41.9 ANXIETY: ICD-10-CM

## 2020-04-28 DIAGNOSIS — K21.9 GASTROESOPHAGEAL REFLUX DISEASE, ESOPHAGITIS PRESENCE NOT SPECIFIED: ICD-10-CM

## 2020-04-28 PROCEDURE — 99213 OFFICE O/P EST LOW 20 MIN: CPT | Performed by: NURSE PRACTITIONER

## 2020-04-28 RX ORDER — PRAVASTATIN SODIUM 40 MG
40 TABLET ORAL DAILY
Qty: 90 TABLET | Refills: 3 | Status: SHIPPED | OUTPATIENT
Start: 2020-04-28 | End: 2021-03-11 | Stop reason: SDUPTHER

## 2020-04-28 RX ORDER — OMEPRAZOLE 40 MG/1
40 CAPSULE, DELAYED RELEASE ORAL DAILY
Qty: 90 CAPSULE | Refills: 3 | Status: SHIPPED | OUTPATIENT
Start: 2020-04-28 | End: 2021-03-11 | Stop reason: SDUPTHER

## 2020-04-28 RX ORDER — HYDROXYZINE HYDROCHLORIDE 25 MG/1
25 TABLET, FILM COATED ORAL 3 TIMES DAILY PRN
Qty: 90 TABLET | Refills: 1 | Status: SHIPPED | OUTPATIENT
Start: 2020-04-28 | End: 2021-03-11 | Stop reason: SDUPTHER

## 2020-04-28 RX ORDER — FUROSEMIDE 20 MG/1
20 TABLET ORAL DAILY
Qty: 90 TABLET | Refills: 3 | Status: SHIPPED | OUTPATIENT
Start: 2020-04-28 | End: 2021-03-11 | Stop reason: SDUPTHER

## 2020-04-28 NOTE — PROGRESS NOTES
Subjective   Cheri Cross is a 59 y.o. female.   You have chosen to receive care through a telehealth visit.  Do you consent to use a video/audio connection for your medical care today? Yes  History of Present Illness    Since the last visit, she has overall felt well.  She has GERD controlled on PPI Rx and Hyperlipidemia with goals met with current Rx.  she has been compliant with current medications have reviewed them.  The patient denies medication side effects.  Will refill medications. There were no vitals taken for this visit.    Results for orders placed or performed in visit on 07/25/19   Comprehensive metabolic panel   Result Value Ref Range    Glucose 96 65 - 99 mg/dL    BUN 19 6 - 20 mg/dL    Creatinine 0.72 0.57 - 1.00 mg/dL    eGFR Non African Am 83 >60 mL/min/1.73    eGFR African Am 100 >60 mL/min/1.73    BUN/Creatinine Ratio 26.4 (H) 7.0 - 25.0    Sodium 142 136 - 145 mmol/L    Potassium 4.0 3.5 - 5.2 mmol/L    Chloride 100 98 - 107 mmol/L    Total CO2 29.2 (H) 22.0 - 29.0 mmol/L    Calcium 8.7 8.6 - 10.5 mg/dL    Total Protein 6.4 6.0 - 8.5 g/dL    Albumin 4.10 3.50 - 5.20 g/dL    Globulin 2.3 gm/dL    A/G Ratio 1.8 g/dL    Total Bilirubin 0.3 0.2 - 1.2 mg/dL    Alkaline Phosphatase 108 39 - 117 U/L    AST (SGOT) 18 1 - 32 U/L    ALT (SGPT) 15 1 - 33 U/L   Lipid panel   Result Value Ref Range    Total Cholesterol 207 (H) 0 - 200 mg/dL    Triglycerides 65 0 - 150 mg/dL    HDL Cholesterol 64 (H) 40 - 60 mg/dL    VLDL Cholesterol 13 mg/dL    LDL Cholesterol  130 (H) 0 - 100 mg/dL   TSH   Result Value Ref Range    TSH 2.550 0.270 - 4.200 mIU/mL   CBC and Differential   Result Value Ref Range    WBC 6.49 3.40 - 10.80 10*3/mm3    RBC 4.52 3.77 - 5.28 10*6/mm3    Hemoglobin 12.5 12.0 - 15.9 g/dL    Hematocrit 41.7 34.0 - 46.6 %    MCV 92.3 79.0 - 97.0 fL    MCH 27.7 26.6 - 33.0 pg    MCHC 30.0 (L) 31.5 - 35.7 g/dL    RDW 14.2 12.3 - 15.4 %    Platelets 311 140 - 450 10*3/mm3    Neutrophil Rel % 57.1 42.7  - 76.0 %    Lymphocyte Rel % 35.6 19.6 - 45.3 %    Monocyte Rel % 6.5 5.0 - 12.0 %    Eosinophil Rel % 0.0 (L) 0.3 - 6.2 %    Basophil Rel % 0.5 0.0 - 1.5 %    Neutrophils Absolute 3.71 1.70 - 7.00 10*3/mm3    Lymphocytes Absolute 2.31 0.70 - 3.10 10*3/mm3    Monocytes Absolute 0.42 0.10 - 0.90 10*3/mm3    Eosinophils Absolute 0.00 0.00 - 0.40 10*3/mm3    Basophils Absolute 0.03 0.00 - 0.20 10*3/mm3    Immature Granulocyte Rel % 0.3 0.0 - 0.5 %    Immature Grans Absolute 0.02 0.00 - 0.05 10*3/mm3    nRBC 0.0 0.0 - 0.2 /100 WBC     Uses hydroxyzine as needed for anxiety but rarely has to take.     The following portions of the patient's history were reviewed and updated as appropriate: allergies, current medications, past family history, past medical history, past social history, past surgical history and problem list.    Review of Systems   Constitutional: Negative for unexpected weight change.   Respiratory: Negative for shortness of breath.    Cardiovascular: Negative for chest pain and palpitations.   Endocrine: Negative for cold intolerance, heat intolerance, polydipsia, polyphagia and polyuria.   Psychiatric/Behavioral: Negative for behavioral problems.       Objective   Physical Exam   Constitutional: She is oriented to person, place, and time. She appears well-developed and well-nourished.   Pulmonary/Chest: Effort normal.   Neurological: She is alert and oriented to person, place, and time.   Psychiatric: She has a normal mood and affect. Her behavior is normal. Judgment and thought content normal.   Nursing note and vitals reviewed.      Assessment/Plan   Cheri was seen today for asthma.    Diagnoses and all orders for this visit:    Mixed hyperlipidemia  -     pravastatin (PRAVACHOL) 40 MG tablet; Take 1 tablet by mouth Daily.  -     Comprehensive metabolic panel  -     Lipid panel  -     CBC and Differential  -     TSH    Gastroesophageal reflux disease, esophagitis presence not specified  -      omeprazole (PrilOSEC) 40 MG capsule; Take 1 capsule by mouth Daily. One PO daily for GERD    Anxiety  -     hydrOXYzine (ATARAX) 25 MG tablet; Take 1 tablet by mouth 3 (Three) Times a Day As Needed for Anxiety.    Edema, unspecified type  -     furosemide (LASIX) 20 MG tablet; Take 1 tablet by mouth Daily.        This visit has been rescheduled as a phone visit to comply with patient safety concerns in accordance with CDC recommendations. Total time of discussion was 8 minutes.

## 2020-04-30 ENCOUNTER — TELEPHONE (OUTPATIENT)
Dept: FAMILY MEDICINE CLINIC | Facility: CLINIC | Age: 60
End: 2020-04-30

## 2020-04-30 DIAGNOSIS — J45.30 MILD PERSISTENT ASTHMA WITHOUT COMPLICATION: ICD-10-CM

## 2020-05-01 RX ORDER — ALBUTEROL SULFATE 90 UG/1
2 AEROSOL, METERED RESPIRATORY (INHALATION) EVERY 4 HOURS PRN
Qty: 1 INHALER | Refills: 11 | Status: SHIPPED | OUTPATIENT
Start: 2020-05-01 | End: 2021-08-05 | Stop reason: SDUPTHER

## 2020-05-29 ENCOUNTER — TELEPHONE (OUTPATIENT)
Dept: FAMILY MEDICINE CLINIC | Facility: CLINIC | Age: 60
End: 2020-05-29

## 2020-06-12 ENCOUNTER — APPOINTMENT (OUTPATIENT)
Dept: WOMENS IMAGING | Facility: HOSPITAL | Age: 60
End: 2020-06-12

## 2020-06-12 PROCEDURE — 77063 BREAST TOMOSYNTHESIS BI: CPT | Performed by: RADIOLOGY

## 2020-06-12 PROCEDURE — 77067 SCR MAMMO BI INCL CAD: CPT | Performed by: RADIOLOGY

## 2020-10-02 ENCOUNTER — OFFICE VISIT (OUTPATIENT)
Dept: FAMILY MEDICINE CLINIC | Facility: CLINIC | Age: 60
End: 2020-10-02

## 2020-10-02 VITALS
DIASTOLIC BLOOD PRESSURE: 60 MMHG | TEMPERATURE: 97.3 F | BODY MASS INDEX: 28.06 KG/M2 | HEIGHT: 67 IN | SYSTOLIC BLOOD PRESSURE: 100 MMHG | OXYGEN SATURATION: 95 % | RESPIRATION RATE: 16 BRPM | WEIGHT: 178.8 LBS | HEART RATE: 57 BPM

## 2020-10-02 DIAGNOSIS — L71.9 ROSACEA: Primary | ICD-10-CM

## 2020-10-02 DIAGNOSIS — F41.9 ANXIETY AND DEPRESSION: ICD-10-CM

## 2020-10-02 DIAGNOSIS — F32.A ANXIETY AND DEPRESSION: ICD-10-CM

## 2020-10-02 PROCEDURE — 99214 OFFICE O/P EST MOD 30 MIN: CPT | Performed by: NURSE PRACTITIONER

## 2020-10-02 RX ORDER — METRONIDAZOLE 7.5 MG/G
GEL TOPICAL 2 TIMES DAILY
Qty: 45 G | Refills: 5 | Status: SHIPPED | OUTPATIENT
Start: 2020-10-02 | End: 2021-03-11

## 2020-10-02 NOTE — PROGRESS NOTES
"Subjective   Cheri Cross is a 60 y.o. female.     History of Present Illness   Cheri Cross female 60 y.o., /60   Pulse 57   Temp 97.3 °F (36.3 °C)   Resp 16   Ht 170.2 cm (67\")   Wt 81.1 kg (178 lb 12.8 oz)   SpO2 95%   BMI 28.00 kg/m²   who presents today for new evaluation and treatment of Depression and Anxiety.  She reports depressed mood and anxiety, excessive worry, unable to relax and irritable. Onset of symptoms was approximately several months ago.  She denies current suicidal and homicidal ideation. Risk factors are lifestyle of multiple roles and daughter and her children staying with them. Patient is also working from home and feels she is unable to get away to have time alone.  Previous treatment includes hydroxyzine.  She complains of the following medication side effects: none.        Cheri Cross 60 y.o. female presents for evaluation of a rash involving the face. Rash has been present for: 4 weeks. Lesions are erythematous, and are described as papular and dry. Rash has changed over time. Rash causes no discomfort. Associated symptoms  .none.  Patient denies:new rash areas..  Treatment to date includes: Vasaline without improvement. Symptoms are unchanged. Patient has not had contacts with similar rash. Patient has not had new exposures (soaps, lotions, laundry detergents, foods, medications, plants, insects or animals).            The following portions of the patient's history were reviewed and updated as appropriate: allergies, current medications, past family history, past medical history, past social history, past surgical history and problem list.    Review of Systems   Constitutional: Negative for unexpected weight change.   Respiratory: Negative for shortness of breath.    Cardiovascular: Negative for chest pain and palpitations.   Skin: Positive for rash.   Psychiatric/Behavioral: Positive for dysphoric mood and sleep disturbance. Negative for behavioral problems, " hallucinations, self-injury and suicidal ideas. The patient is nervous/anxious. The patient is not hyperactive.        Objective   Physical Exam  Vitals signs and nursing note reviewed.   Constitutional:       Appearance: She is well-developed.   Cardiovascular:      Rate and Rhythm: Normal rate.   Pulmonary:      Effort: Pulmonary effort is normal.   Skin:     Findings: Rash present. Rash is papular.      Comments: Erythematous papular eruption to bilateral maxillary area with a few scattered papules to chin   Neurological:      Mental Status: She is alert and oriented to person, place, and time.   Psychiatric:         Attention and Perception: Attention normal.         Mood and Affect: Mood is anxious.         Speech: Speech normal.         Behavior: Behavior is agitated. Behavior is cooperative.         Thought Content: Thought content normal.         Cognition and Memory: Cognition normal.         Judgment: Judgment normal.         Assessment/Plan   Cheri was seen today for face flushing and increased stress.    Diagnoses and all orders for this visit:    Rosacea  -     metroNIDAZOLE (METROGEL) 0.75 % gel; Apply  topically to the appropriate area as directed 2 (Two) Times a Day.    Anxiety and depression  -     sertraline (Zoloft) 50 MG tablet; Take 1 tablet by mouth Daily. Start with 1/2 tab PO daily for 4 days then one po daily for stress        F/u in 4 weeks to recheck.

## 2020-10-29 DIAGNOSIS — F41.9 ANXIETY AND DEPRESSION: ICD-10-CM

## 2020-10-29 DIAGNOSIS — F32.A ANXIETY AND DEPRESSION: ICD-10-CM

## 2020-11-28 DIAGNOSIS — F32.A ANXIETY AND DEPRESSION: ICD-10-CM

## 2020-11-28 DIAGNOSIS — F41.9 ANXIETY AND DEPRESSION: ICD-10-CM

## 2020-12-29 DIAGNOSIS — F41.9 ANXIETY AND DEPRESSION: ICD-10-CM

## 2020-12-29 DIAGNOSIS — F32.A ANXIETY AND DEPRESSION: ICD-10-CM

## 2021-01-27 DIAGNOSIS — F32.A ANXIETY AND DEPRESSION: ICD-10-CM

## 2021-01-27 DIAGNOSIS — F41.9 ANXIETY AND DEPRESSION: ICD-10-CM

## 2021-02-25 DIAGNOSIS — F32.A ANXIETY AND DEPRESSION: ICD-10-CM

## 2021-02-25 DIAGNOSIS — F41.9 ANXIETY AND DEPRESSION: ICD-10-CM

## 2021-03-11 ENCOUNTER — OFFICE VISIT (OUTPATIENT)
Dept: FAMILY MEDICINE CLINIC | Facility: CLINIC | Age: 61
End: 2021-03-11

## 2021-03-11 VITALS
TEMPERATURE: 97.5 F | HEART RATE: 57 BPM | OXYGEN SATURATION: 97 % | HEIGHT: 67 IN | RESPIRATION RATE: 16 BRPM | SYSTOLIC BLOOD PRESSURE: 120 MMHG | WEIGHT: 176 LBS | BODY MASS INDEX: 27.62 KG/M2 | DIASTOLIC BLOOD PRESSURE: 70 MMHG

## 2021-03-11 DIAGNOSIS — K21.9 GASTROESOPHAGEAL REFLUX DISEASE: ICD-10-CM

## 2021-03-11 DIAGNOSIS — R60.9 EDEMA, UNSPECIFIED TYPE: ICD-10-CM

## 2021-03-11 DIAGNOSIS — F41.9 ANXIETY: ICD-10-CM

## 2021-03-11 DIAGNOSIS — E78.2 MIXED HYPERLIPIDEMIA: ICD-10-CM

## 2021-03-11 PROCEDURE — 99214 OFFICE O/P EST MOD 30 MIN: CPT | Performed by: NURSE PRACTITIONER

## 2021-03-11 RX ORDER — PRAVASTATIN SODIUM 40 MG
40 TABLET ORAL DAILY
Qty: 90 TABLET | Refills: 3 | Status: SHIPPED | OUTPATIENT
Start: 2021-03-11 | End: 2021-07-08 | Stop reason: SDUPTHER

## 2021-03-11 RX ORDER — FUROSEMIDE 20 MG/1
20 TABLET ORAL DAILY
Qty: 90 TABLET | Refills: 3 | Status: SHIPPED | OUTPATIENT
Start: 2021-03-11 | End: 2021-07-08 | Stop reason: SDUPTHER

## 2021-03-11 RX ORDER — HYDROXYZINE HYDROCHLORIDE 25 MG/1
25 TABLET, FILM COATED ORAL 3 TIMES DAILY PRN
Qty: 90 TABLET | Refills: 3 | Status: SHIPPED | OUTPATIENT
Start: 2021-03-11 | End: 2021-08-05 | Stop reason: SDUPTHER

## 2021-03-11 RX ORDER — OMEPRAZOLE 40 MG/1
40 CAPSULE, DELAYED RELEASE ORAL DAILY
Qty: 90 CAPSULE | Refills: 3 | Status: SHIPPED | OUTPATIENT
Start: 2021-03-11 | End: 2021-08-05 | Stop reason: SDUPTHER

## 2021-03-11 NOTE — PROGRESS NOTES
"Subjective   Cheri Cross is a 60 y.o. female.     History of Present Illness    Since the last visit, she has overall felt well.  She has GERD controlled on PPI Rx and hyperlipidemia and is due for labs. She has anxiety that she feels is well controlled. She would like to wean off sertraline and takehydroxyzine as needed.  She also needs refill on her furosemide that she takes daily for lower extremity edema.  she has been compliant with current medications have reviewed them.  The patient denies medication side effects.  Will refill medications. /70   Pulse 57   Temp 97.5 °F (36.4 °C)   Resp 16   Ht 170.2 cm (67\")   Wt 79.8 kg (176 lb)   SpO2 97%   BMI 27.57 kg/m²     Results for orders placed or performed in visit on 07/25/19   Comprehensive metabolic panel    Specimen: Blood   Result Value Ref Range    Glucose 96 65 - 99 mg/dL    BUN 19 6 - 20 mg/dL    Creatinine 0.72 0.57 - 1.00 mg/dL    eGFR Non African Am 83 >60 mL/min/1.73    eGFR African Am 100 >60 mL/min/1.73    BUN/Creatinine Ratio 26.4 (H) 7.0 - 25.0    Sodium 142 136 - 145 mmol/L    Potassium 4.0 3.5 - 5.2 mmol/L    Chloride 100 98 - 107 mmol/L    Total CO2 29.2 (H) 22.0 - 29.0 mmol/L    Calcium 8.7 8.6 - 10.5 mg/dL    Total Protein 6.4 6.0 - 8.5 g/dL    Albumin 4.10 3.50 - 5.20 g/dL    Globulin 2.3 gm/dL    A/G Ratio 1.8 g/dL    Total Bilirubin 0.3 0.2 - 1.2 mg/dL    Alkaline Phosphatase 108 39 - 117 U/L    AST (SGOT) 18 1 - 32 U/L    ALT (SGPT) 15 1 - 33 U/L   Lipid panel    Specimen: Blood   Result Value Ref Range    Total Cholesterol 207 (H) 0 - 200 mg/dL    Triglycerides 65 0 - 150 mg/dL    HDL Cholesterol 64 (H) 40 - 60 mg/dL    VLDL Cholesterol 13 mg/dL    LDL Cholesterol  130 (H) 0 - 100 mg/dL   TSH    Specimen: Blood   Result Value Ref Range    TSH 2.550 0.270 - 4.200 mIU/mL   CBC and Differential    Specimen: Blood   Result Value Ref Range    WBC 6.49 3.40 - 10.80 10*3/mm3    RBC 4.52 3.77 - 5.28 10*6/mm3    Hemoglobin 12.5 " 12.0 - 15.9 g/dL    Hematocrit 41.7 34.0 - 46.6 %    MCV 92.3 79.0 - 97.0 fL    MCH 27.7 26.6 - 33.0 pg    MCHC 30.0 (L) 31.5 - 35.7 g/dL    RDW 14.2 12.3 - 15.4 %    Platelets 311 140 - 450 10*3/mm3    Neutrophil Rel % 57.1 42.7 - 76.0 %    Lymphocyte Rel % 35.6 19.6 - 45.3 %    Monocyte Rel % 6.5 5.0 - 12.0 %    Eosinophil Rel % 0.0 (L) 0.3 - 6.2 %    Basophil Rel % 0.5 0.0 - 1.5 %    Neutrophils Absolute 3.71 1.70 - 7.00 10*3/mm3    Lymphocytes Absolute 2.31 0.70 - 3.10 10*3/mm3    Monocytes Absolute 0.42 0.10 - 0.90 10*3/mm3    Eosinophils Absolute 0.00 0.00 - 0.40 10*3/mm3    Basophils Absolute 0.03 0.00 - 0.20 10*3/mm3    Immature Granulocyte Rel % 0.3 0.0 - 0.5 %    Immature Grans Absolute 0.02 0.00 - 0.05 10*3/mm3    nRBC 0.0 0.0 - 0.2 /100 WBC         The following portions of the patient's history were reviewed and updated as appropriate: allergies, current medications, past family history, past medical history, past social history, past surgical history and problem list.    Review of Systems   Constitutional: Negative for unexpected weight change.   Respiratory: Negative for shortness of breath.    Cardiovascular: Negative for chest pain and palpitations.   Endocrine: Negative for cold intolerance, heat intolerance, polydipsia, polyphagia and polyuria.   Psychiatric/Behavioral: Negative for behavioral problems.       Objective   Physical Exam  Vitals and nursing note reviewed.   Constitutional:       Appearance: Normal appearance. She is well-developed.   Cardiovascular:      Rate and Rhythm: Normal rate and regular rhythm.   Pulmonary:      Effort: Pulmonary effort is normal.      Breath sounds: Normal breath sounds.   Neurological:      Mental Status: She is alert and oriented to person, place, and time.   Psychiatric:         Mood and Affect: Mood normal.         Behavior: Behavior normal.         Thought Content: Thought content normal.         Judgment: Judgment normal.         Assessment/Plan    Diagnoses and all orders for this visit:    1. Mixed hyperlipidemia  -     pravastatin (PRAVACHOL) 40 MG tablet; Take 1 tablet by mouth Daily.  Dispense: 90 tablet; Refill: 3  -     Comprehensive metabolic panel  -     Lipid panel  -     CBC and Differential  -     TSH    2. Gastroesophageal reflux disease  -     omeprazole (priLOSEC) 40 MG capsule; Take 1 capsule by mouth Daily. One PO daily for GERD  Dispense: 90 capsule; Refill: 3  -     Comprehensive metabolic panel  -     Lipid panel  -     CBC and Differential  -     TSH    3. Edema, unspecified type  -     furosemide (LASIX) 20 MG tablet; Take 1 tablet by mouth Daily.  Dispense: 90 tablet; Refill: 3    4. Anxiety  -     hydrOXYzine (ATARAX) 25 MG tablet; Take 1 tablet by mouth 3 (Three) Times a Day As Needed for Anxiety.  Dispense: 90 tablet; Refill: 3

## 2021-03-12 LAB
ALBUMIN SERPL-MCNC: 4.2 G/DL (ref 3.5–5.2)
ALBUMIN/GLOB SERPL: 2.1 G/DL
ALP SERPL-CCNC: 97 U/L (ref 39–117)
ALT SERPL-CCNC: 16 U/L (ref 1–33)
AST SERPL-CCNC: 22 U/L (ref 1–32)
BASOPHILS # BLD AUTO: 0.05 10*3/MM3 (ref 0–0.2)
BASOPHILS NFR BLD AUTO: 0.9 % (ref 0–1.5)
BILIRUB SERPL-MCNC: 0.3 MG/DL (ref 0–1.2)
BUN SERPL-MCNC: 20 MG/DL (ref 8–23)
BUN/CREAT SERPL: 27 (ref 7–25)
CALCIUM SERPL-MCNC: 9 MG/DL (ref 8.6–10.5)
CHLORIDE SERPL-SCNC: 104 MMOL/L (ref 98–107)
CHOLEST SERPL-MCNC: 226 MG/DL (ref 0–200)
CO2 SERPL-SCNC: 28.5 MMOL/L (ref 22–29)
CREAT SERPL-MCNC: 0.74 MG/DL (ref 0.57–1)
EOSINOPHIL # BLD AUTO: 0 10*3/MM3 (ref 0–0.4)
EOSINOPHIL NFR BLD AUTO: 0 % (ref 0.3–6.2)
ERYTHROCYTE [DISTWIDTH] IN BLOOD BY AUTOMATED COUNT: 13 % (ref 12.3–15.4)
GLOBULIN SER CALC-MCNC: 2 GM/DL
GLUCOSE SERPL-MCNC: 88 MG/DL (ref 65–99)
HCT VFR BLD AUTO: 41.7 % (ref 34–46.6)
HDLC SERPL-MCNC: 61 MG/DL (ref 40–60)
HGB BLD-MCNC: 13.3 G/DL (ref 12–15.9)
IMM GRANULOCYTES # BLD AUTO: 0.01 10*3/MM3 (ref 0–0.05)
IMM GRANULOCYTES NFR BLD AUTO: 0.2 % (ref 0–0.5)
LDLC SERPL CALC-MCNC: 150 MG/DL (ref 0–100)
LYMPHOCYTES # BLD AUTO: 2.05 10*3/MM3 (ref 0.7–3.1)
LYMPHOCYTES NFR BLD AUTO: 38.5 % (ref 19.6–45.3)
MCH RBC QN AUTO: 28.1 PG (ref 26.6–33)
MCHC RBC AUTO-ENTMCNC: 31.9 G/DL (ref 31.5–35.7)
MCV RBC AUTO: 88 FL (ref 79–97)
MONOCYTES # BLD AUTO: 0.43 10*3/MM3 (ref 0.1–0.9)
MONOCYTES NFR BLD AUTO: 8.1 % (ref 5–12)
NEUTROPHILS # BLD AUTO: 2.79 10*3/MM3 (ref 1.7–7)
NEUTROPHILS NFR BLD AUTO: 52.3 % (ref 42.7–76)
NRBC BLD AUTO-RTO: 0 /100 WBC (ref 0–0.2)
PLATELET # BLD AUTO: 271 10*3/MM3 (ref 140–450)
POTASSIUM SERPL-SCNC: 4.8 MMOL/L (ref 3.5–5.2)
PROT SERPL-MCNC: 6.2 G/DL (ref 6–8.5)
RBC # BLD AUTO: 4.74 10*6/MM3 (ref 3.77–5.28)
SODIUM SERPL-SCNC: 142 MMOL/L (ref 136–145)
TRIGL SERPL-MCNC: 87 MG/DL (ref 0–150)
TSH SERPL DL<=0.005 MIU/L-ACNC: 2.01 UIU/ML (ref 0.27–4.2)
VLDLC SERPL CALC-MCNC: 15 MG/DL (ref 5–40)
WBC # BLD AUTO: 5.33 10*3/MM3 (ref 3.4–10.8)

## 2021-03-25 ENCOUNTER — TELEPHONE (OUTPATIENT)
Dept: FAMILY MEDICINE CLINIC | Facility: CLINIC | Age: 61
End: 2021-03-25

## 2021-03-25 NOTE — TELEPHONE ENCOUNTER
----- Message from JOSHUA Trejo sent at 3/25/2021 10:15 AM EDT -----  Need to reduce cholesterol and fat in diet.  May need to increase medication with next lab set if continues to trend up.

## 2021-05-05 ENCOUNTER — OFFICE VISIT (OUTPATIENT)
Dept: FAMILY MEDICINE CLINIC | Facility: CLINIC | Age: 61
End: 2021-05-05

## 2021-05-05 VITALS
WEIGHT: 178 LBS | HEART RATE: 61 BPM | TEMPERATURE: 97.7 F | OXYGEN SATURATION: 97 % | BODY MASS INDEX: 27.94 KG/M2 | SYSTOLIC BLOOD PRESSURE: 110 MMHG | HEIGHT: 67 IN | DIASTOLIC BLOOD PRESSURE: 70 MMHG | RESPIRATION RATE: 16 BRPM

## 2021-05-05 DIAGNOSIS — L71.9 ACNE ROSACEA: Primary | ICD-10-CM

## 2021-05-05 PROCEDURE — 99213 OFFICE O/P EST LOW 20 MIN: CPT | Performed by: NURSE PRACTITIONER

## 2021-05-05 RX ORDER — DOXYCYCLINE 100 MG/1
100 TABLET ORAL 2 TIMES DAILY
Qty: 20 TABLET | Refills: 0 | Status: SHIPPED | OUTPATIENT
Start: 2021-05-05 | End: 2021-08-05 | Stop reason: SDUPTHER

## 2021-05-05 NOTE — PROGRESS NOTES
Subjective   Cheri Cross is a 60 y.o. female.     History of Present Illness   Patient presents to office to f/u on her Rosacea.  She states the Climara was working initially but does not seem to be controlling symptoms.  She would like to discuss other options.   The following portions of the patient's history were reviewed and updated as appropriate: allergies, current medications, past family history, past medical history, past social history, past surgical history and problem list.    Review of Systems   Constitutional: Negative for unexpected weight change.   Respiratory: Negative for shortness of breath.    Cardiovascular: Negative for chest pain and palpitations.   Skin: Positive for rash.   Psychiatric/Behavioral: Negative for behavioral problems.       Objective   Physical Exam  Vitals and nursing note reviewed.   Constitutional:       Appearance: Normal appearance. She is well-developed.   Cardiovascular:      Rate and Rhythm: Normal rate.   Pulmonary:      Effort: Pulmonary effort is normal.   Skin:     General: Skin is warm and dry.      Findings: Erythema and rash present. Rash is papular.   Neurological:      Mental Status: She is alert and oriented to person, place, and time.   Psychiatric:         Mood and Affect: Mood normal.         Behavior: Behavior normal.         Thought Content: Thought content normal.         Judgment: Judgment normal.         Assessment/Plan   Diagnoses and all orders for this visit:    1. Acne rosacea (Primary)  -     doxycycline (ADOXA) 100 MG tablet; Take 1 tablet by mouth 2 (Two) Times a Day.  Dispense: 20 tablet; Refill: 0

## 2021-07-02 DIAGNOSIS — R60.9 EDEMA, UNSPECIFIED TYPE: ICD-10-CM

## 2021-07-02 DIAGNOSIS — E78.2 MIXED HYPERLIPIDEMIA: ICD-10-CM

## 2021-07-02 RX ORDER — FUROSEMIDE 20 MG/1
20 TABLET ORAL DAILY
Qty: 90 TABLET | Refills: 3 | OUTPATIENT
Start: 2021-07-02

## 2021-07-02 RX ORDER — PRAVASTATIN SODIUM 40 MG
40 TABLET ORAL DAILY
Qty: 90 TABLET | Refills: 3 | OUTPATIENT
Start: 2021-07-02

## 2021-07-02 NOTE — TELEPHONE ENCOUNTER
Caller: Tay Cheri HECTOR    Relationship: Self    Best call back number: [phone]    Medication needed:   Requested Prescriptions     Pending Prescriptions Disp Refills   • pravastatin (PRAVACHOL) 40 MG tablet 90 tablet 3     Sig: Take 1 tablet by mouth Daily.   • furosemide (LASIX) 20 MG tablet 90 tablet 3     Sig: Take 1 tablet by mouth Daily.       When do you need the refill by: ASAP    What additional details did the patient provide when requesting the medication: PATIENT IS GOING OUT OF TOWN. REQUESTING A LITTLE EARLY    Does the patient have less than a 3 day supply:  [] Yes  [x] No    What is the patient's preferred pharmacy: [id number] [place], [place] - [address] AT [facility] - [phone] [facility] [phone] FX

## 2021-07-08 DIAGNOSIS — E78.2 MIXED HYPERLIPIDEMIA: ICD-10-CM

## 2021-07-08 DIAGNOSIS — R60.9 EDEMA, UNSPECIFIED TYPE: ICD-10-CM

## 2021-07-08 RX ORDER — PRAVASTATIN SODIUM 40 MG
40 TABLET ORAL DAILY
Qty: 30 TABLET | Refills: 0 | Status: SHIPPED | OUTPATIENT
Start: 2021-07-08 | End: 2021-08-05 | Stop reason: SDUPTHER

## 2021-07-08 RX ORDER — FUROSEMIDE 20 MG/1
20 TABLET ORAL DAILY
Qty: 30 TABLET | Refills: 0 | Status: SHIPPED | OUTPATIENT
Start: 2021-07-08 | End: 2021-08-05 | Stop reason: SDUPTHER

## 2021-07-09 RX ORDER — PRAVASTATIN SODIUM 40 MG
40 TABLET ORAL DAILY
Qty: 90 TABLET | OUTPATIENT
Start: 2021-07-09

## 2021-07-09 RX ORDER — FUROSEMIDE 20 MG/1
20 TABLET ORAL DAILY
Qty: 90 TABLET | OUTPATIENT
Start: 2021-07-09

## 2021-08-05 ENCOUNTER — OFFICE VISIT (OUTPATIENT)
Dept: FAMILY MEDICINE CLINIC | Facility: CLINIC | Age: 61
End: 2021-08-05

## 2021-08-05 VITALS
HEART RATE: 73 BPM | BODY MASS INDEX: 27.31 KG/M2 | DIASTOLIC BLOOD PRESSURE: 60 MMHG | HEIGHT: 67 IN | OXYGEN SATURATION: 97 % | WEIGHT: 174 LBS | SYSTOLIC BLOOD PRESSURE: 110 MMHG | RESPIRATION RATE: 16 BRPM | TEMPERATURE: 97.3 F

## 2021-08-05 DIAGNOSIS — E78.2 MIXED HYPERLIPIDEMIA: ICD-10-CM

## 2021-08-05 DIAGNOSIS — R60.9 EDEMA, UNSPECIFIED TYPE: ICD-10-CM

## 2021-08-05 DIAGNOSIS — J45.30 MILD PERSISTENT ASTHMA WITHOUT COMPLICATION: ICD-10-CM

## 2021-08-05 DIAGNOSIS — L71.9 ACNE ROSACEA: ICD-10-CM

## 2021-08-05 DIAGNOSIS — F41.9 ANXIETY: ICD-10-CM

## 2021-08-05 DIAGNOSIS — K21.9 GASTROESOPHAGEAL REFLUX DISEASE: ICD-10-CM

## 2021-08-05 PROCEDURE — 99214 OFFICE O/P EST MOD 30 MIN: CPT | Performed by: NURSE PRACTITIONER

## 2021-08-05 RX ORDER — PRAVASTATIN SODIUM 40 MG
40 TABLET ORAL DAILY
Qty: 90 TABLET | Refills: 3 | Status: SHIPPED | OUTPATIENT
Start: 2021-08-05 | End: 2022-07-29 | Stop reason: SDUPTHER

## 2021-08-05 RX ORDER — DOXYCYCLINE 100 MG/1
100 TABLET ORAL 2 TIMES DAILY
Qty: 30 TABLET | Refills: 1 | Status: SHIPPED | OUTPATIENT
Start: 2021-08-05 | End: 2021-11-09

## 2021-08-05 RX ORDER — OMEPRAZOLE 40 MG/1
40 CAPSULE, DELAYED RELEASE ORAL DAILY
Qty: 90 CAPSULE | Refills: 3 | Status: SHIPPED | OUTPATIENT
Start: 2021-08-05 | End: 2022-03-28 | Stop reason: SDUPTHER

## 2021-08-05 RX ORDER — FUROSEMIDE 20 MG/1
20 TABLET ORAL DAILY
Qty: 90 TABLET | Refills: 3 | Status: SHIPPED | OUTPATIENT
Start: 2021-08-05 | End: 2022-07-29 | Stop reason: SDUPTHER

## 2021-08-05 RX ORDER — HYDROXYZINE HYDROCHLORIDE 25 MG/1
25 TABLET, FILM COATED ORAL 3 TIMES DAILY PRN
Qty: 90 TABLET | Refills: 3 | Status: SHIPPED | OUTPATIENT
Start: 2021-08-05

## 2021-08-05 RX ORDER — ALBUTEROL SULFATE 90 UG/1
2 AEROSOL, METERED RESPIRATORY (INHALATION) EVERY 4 HOURS PRN
Qty: 1 G | Refills: 5 | Status: SHIPPED | OUTPATIENT
Start: 2021-08-05 | End: 2022-12-30 | Stop reason: SDUPTHER

## 2021-08-05 NOTE — PROGRESS NOTES
"Subjective   Cheri Cross is a 61 y.o. female.     History of Present Illness    Since the last visit, she has overall felt well.  She has GERD controlled on PPI Rx, Hyperlipidemia with goals met with current Rx and Asthma well controlled and not requiring rescue Albuterol > 2 times a week.  she has been compliant with current medications have reviewed them.  The patient denies medication side effects.  Will refill medications. /60   Pulse 73   Temp 97.3 °F (36.3 °C)   Resp 16   Ht 170.2 cm (67\")   Wt 78.9 kg (174 lb)   SpO2 97%   BMI 27.25 kg/m²     Results for orders placed or performed in visit on 03/11/21   Comprehensive metabolic panel    Specimen: Blood   Result Value Ref Range    Glucose 88 65 - 99 mg/dL    BUN 20 8 - 23 mg/dL    Creatinine 0.74 0.57 - 1.00 mg/dL    eGFR Non African Am 80 >60 mL/min/1.73    eGFR African Am 97 >60 mL/min/1.73    BUN/Creatinine Ratio 27.0 (H) 7.0 - 25.0    Sodium 142 136 - 145 mmol/L    Potassium 4.8 3.5 - 5.2 mmol/L    Chloride 104 98 - 107 mmol/L    Total CO2 28.5 22.0 - 29.0 mmol/L    Calcium 9.0 8.6 - 10.5 mg/dL    Total Protein 6.2 6.0 - 8.5 g/dL    Albumin 4.20 3.50 - 5.20 g/dL    Globulin 2.0 gm/dL    A/G Ratio 2.1 g/dL    Total Bilirubin 0.3 0.0 - 1.2 mg/dL    Alkaline Phosphatase 97 39 - 117 U/L    AST (SGOT) 22 1 - 32 U/L    ALT (SGPT) 16 1 - 33 U/L   Lipid panel    Specimen: Blood   Result Value Ref Range    Total Cholesterol 226 (H) 0 - 200 mg/dL    Triglycerides 87 0 - 150 mg/dL    HDL Cholesterol 61 (H) 40 - 60 mg/dL    VLDL Cholesterol Curly 15 5 - 40 mg/dL    LDL Chol Calc (NIH) 150 (H) 0 - 100 mg/dL   TSH    Specimen: Blood   Result Value Ref Range    TSH 2.010 0.270 - 4.200 uIU/mL   CBC and Differential    Specimen: Blood   Result Value Ref Range    WBC 5.33 3.40 - 10.80 10*3/mm3    RBC 4.74 3.77 - 5.28 10*6/mm3    Hemoglobin 13.3 12.0 - 15.9 g/dL    Hematocrit 41.7 34.0 - 46.6 %    MCV 88.0 79.0 - 97.0 fL    MCH 28.1 26.6 - 33.0 pg    MCHC 31.9 " 31.5 - 35.7 g/dL    RDW 13.0 12.3 - 15.4 %    Platelets 271 140 - 450 10*3/mm3    Neutrophil Rel % 52.3 42.7 - 76.0 %    Lymphocyte Rel % 38.5 19.6 - 45.3 %    Monocyte Rel % 8.1 5.0 - 12.0 %    Eosinophil Rel % 0.0 (L) 0.3 - 6.2 %    Basophil Rel % 0.9 0.0 - 1.5 %    Neutrophils Absolute 2.79 1.70 - 7.00 10*3/mm3    Lymphocytes Absolute 2.05 0.70 - 3.10 10*3/mm3    Monocytes Absolute 0.43 0.10 - 0.90 10*3/mm3    Eosinophils Absolute 0.00 0.00 - 0.40 10*3/mm3    Basophils Absolute 0.05 0.00 - 0.20 10*3/mm3    Immature Granulocyte Rel % 0.2 0.0 - 0.5 %    Immature Grans Absolute 0.01 0.00 - 0.05 10*3/mm3    nRBC 0.0 0.0 - 0.2 /100 WBC     She takes furosemide for lower extremity edema which is well controlled.    She uses hydroxyzine as needed for anxiety.     The following portions of the patient's history were reviewed and updated as appropriate: allergies, current medications, past family history, past medical history, past social history, past surgical history and problem list.    Review of Systems   Constitutional: Negative for unexpected weight change.   Respiratory: Negative for shortness of breath.    Cardiovascular: Negative for chest pain, palpitations and leg swelling.   Endocrine: Negative for cold intolerance, heat intolerance, polydipsia, polyphagia and polyuria.   Psychiatric/Behavioral: Negative for behavioral problems.       Objective   Physical Exam  Vitals and nursing note reviewed.   Constitutional:       Appearance: Normal appearance. She is well-developed.   Neck:      Vascular: No carotid bruit.   Cardiovascular:      Rate and Rhythm: Normal rate and regular rhythm.   Pulmonary:      Effort: Pulmonary effort is normal.      Breath sounds: Normal breath sounds.   Neurological:      Mental Status: She is alert and oriented to person, place, and time.   Psychiatric:         Mood and Affect: Mood normal.         Behavior: Behavior normal.         Thought Content: Thought content normal.          Judgment: Judgment normal.         Assessment/Plan   Diagnoses and all orders for this visit:    1. Acne rosacea  -     doxycycline (ADOXA) 100 MG tablet; Take 1 tablet by mouth 2 (Two) Times a Day.  Dispense: 30 tablet; Refill: 1    2. Edema, unspecified type  -     furosemide (LASIX) 20 MG tablet; Take 1 tablet by mouth Daily.  Dispense: 90 tablet; Refill: 3    3. Gastroesophageal reflux disease  -     omeprazole (priLOSEC) 40 MG capsule; Take 1 capsule by mouth Daily. One PO daily for GERD  Dispense: 90 capsule; Refill: 3    4. Mixed hyperlipidemia  -     pravastatin (PRAVACHOL) 40 MG tablet; Take 1 tablet by mouth Daily.  Dispense: 90 tablet; Refill: 3    5. Mild persistent asthma without complication  -     albuterol sulfate  (90 Base) MCG/ACT inhaler; Inhale 2 puffs Every 4 (Four) Hours As Needed for Wheezing.  Dispense: 1 g; Refill: 5    6. Anxiety  -     hydrOXYzine (ATARAX) 25 MG tablet; Take 1 tablet by mouth 3 (Three) Times a Day As Needed for Anxiety.  Dispense: 90 tablet; Refill: 3      Labs reviewed with pt today during visit. All questions answered.

## 2021-11-08 DIAGNOSIS — L71.9 ACNE ROSACEA: ICD-10-CM

## 2021-11-09 ENCOUNTER — APPOINTMENT (OUTPATIENT)
Dept: WOMENS IMAGING | Facility: HOSPITAL | Age: 61
End: 2021-11-09

## 2021-11-09 PROCEDURE — 77067 SCR MAMMO BI INCL CAD: CPT | Performed by: RADIOLOGY

## 2021-11-09 PROCEDURE — 77063 BREAST TOMOSYNTHESIS BI: CPT | Performed by: RADIOLOGY

## 2021-11-09 RX ORDER — DOXYCYCLINE 100 MG/1
TABLET ORAL
Qty: 30 TABLET | Refills: 1 | Status: SHIPPED | OUTPATIENT
Start: 2021-11-09 | End: 2022-01-14

## 2022-01-14 DIAGNOSIS — L71.9 ACNE ROSACEA: ICD-10-CM

## 2022-01-14 RX ORDER — DOXYCYCLINE 100 MG/1
TABLET ORAL
Qty: 30 TABLET | Refills: 1 | Status: SHIPPED | OUTPATIENT
Start: 2022-01-14 | End: 2022-04-11

## 2022-03-28 DIAGNOSIS — K21.9 GASTROESOPHAGEAL REFLUX DISEASE: ICD-10-CM

## 2022-03-28 RX ORDER — OMEPRAZOLE 40 MG/1
40 CAPSULE, DELAYED RELEASE ORAL DAILY
Qty: 90 CAPSULE | Refills: 1 | Status: SHIPPED | OUTPATIENT
Start: 2022-03-28 | End: 2022-09-28

## 2022-03-28 NOTE — TELEPHONE ENCOUNTER
.    Caller: Cheri Cross    Relationship: Self    Best call back number: [phone]*    Requested Prescriptions:   Requested Prescriptions     Pending Prescriptions Disp Refills   • omeprazole (priLOSEC) 40 MG capsule 90 capsule 3     Sig: Take 1 capsule by mouth Daily. One PO daily for GERD        Pharmacy [facility] request [facility] be sent: [id number] [place], [place] - [address] AT [facility] - [phone] PH - [phone] FX     Additional details provided by patient: PATIENT STATES SHE IS OUT OF THIS MEDICATION.    Does the patient have less than a 3 day supply:  [x] Yes  [] No    Kari Munoz   03/28/22 13:33 EDT

## 2022-04-10 DIAGNOSIS — L71.9 ACNE ROSACEA: ICD-10-CM

## 2022-04-11 RX ORDER — DOXYCYCLINE 100 MG/1
TABLET ORAL
Qty: 30 TABLET | Refills: 1 | Status: SHIPPED | OUTPATIENT
Start: 2022-04-11 | End: 2022-06-09

## 2022-06-09 DIAGNOSIS — L71.9 ACNE ROSACEA: ICD-10-CM

## 2022-06-09 RX ORDER — DOXYCYCLINE 100 MG/1
TABLET ORAL
Qty: 30 TABLET | Refills: 0 | Status: SHIPPED | OUTPATIENT
Start: 2022-06-09 | End: 2022-07-29

## 2022-07-23 DIAGNOSIS — L71.9 ACNE ROSACEA: ICD-10-CM

## 2022-07-25 RX ORDER — DOXYCYCLINE 100 MG/1
TABLET ORAL
Qty: 30 TABLET | Refills: 0 | OUTPATIENT
Start: 2022-07-25

## 2022-07-29 ENCOUNTER — OFFICE VISIT (OUTPATIENT)
Dept: FAMILY MEDICINE CLINIC | Facility: CLINIC | Age: 62
End: 2022-07-29

## 2022-07-29 VITALS
BODY MASS INDEX: 27 KG/M2 | HEIGHT: 67 IN | RESPIRATION RATE: 16 BRPM | SYSTOLIC BLOOD PRESSURE: 110 MMHG | TEMPERATURE: 97.5 F | HEART RATE: 68 BPM | OXYGEN SATURATION: 99 % | DIASTOLIC BLOOD PRESSURE: 70 MMHG | WEIGHT: 172 LBS

## 2022-07-29 DIAGNOSIS — R60.9 EDEMA, UNSPECIFIED TYPE: ICD-10-CM

## 2022-07-29 DIAGNOSIS — Z12.11 SCREENING FOR COLORECTAL CANCER: ICD-10-CM

## 2022-07-29 DIAGNOSIS — E78.2 MIXED HYPERLIPIDEMIA: Primary | ICD-10-CM

## 2022-07-29 DIAGNOSIS — Z12.12 SCREENING FOR COLORECTAL CANCER: ICD-10-CM

## 2022-07-29 PROCEDURE — 99213 OFFICE O/P EST LOW 20 MIN: CPT | Performed by: NURSE PRACTITIONER

## 2022-07-29 RX ORDER — DOXYCYCLINE 100 MG/1
100 TABLET ORAL 2 TIMES DAILY
COMMUNITY
End: 2022-08-01 | Stop reason: SDUPTHER

## 2022-07-29 RX ORDER — PRAVASTATIN SODIUM 40 MG
40 TABLET ORAL DAILY
Qty: 90 TABLET | Refills: 3 | Status: SHIPPED | OUTPATIENT
Start: 2022-07-29

## 2022-07-29 RX ORDER — FUROSEMIDE 20 MG/1
20 TABLET ORAL DAILY
Qty: 90 TABLET | Refills: 3 | Status: SHIPPED | OUTPATIENT
Start: 2022-07-29

## 2022-07-29 NOTE — PROGRESS NOTES
"Subjective   Cheri Cross is a 62 y.o. female.     History of Present Illness    Since the last visit, she has overall felt well.  She has GERD controlled on PPI Rx and Hyperlipidemia with goals met with current Rx.  she has been compliant with current medications have reviewed them.  The patient denies medication side effects.  Will refill medications. /70   Pulse 68   Temp 97.5 °F (36.4 °C)   Resp 16   Ht 170.2 cm (67\")   Wt 78 kg (172 lb)   SpO2 99%   BMI 26.94 kg/m²     Results for orders placed or performed in visit on 03/11/21   Comprehensive metabolic panel    Specimen: Blood   Result Value Ref Range    Glucose 88 65 - 99 mg/dL    BUN 20 8 - 23 mg/dL    Creatinine 0.74 0.57 - 1.00 mg/dL    eGFR Non African Am 80 >60 mL/min/1.73    eGFR African Am 97 >60 mL/min/1.73    BUN/Creatinine Ratio 27.0 (H) 7.0 - 25.0    Sodium 142 136 - 145 mmol/L    Potassium 4.8 3.5 - 5.2 mmol/L    Chloride 104 98 - 107 mmol/L    Total CO2 28.5 22.0 - 29.0 mmol/L    Calcium 9.0 8.6 - 10.5 mg/dL    Total Protein 6.2 6.0 - 8.5 g/dL    Albumin 4.20 3.50 - 5.20 g/dL    Globulin 2.0 gm/dL    A/G Ratio 2.1 g/dL    Total Bilirubin 0.3 0.0 - 1.2 mg/dL    Alkaline Phosphatase 97 39 - 117 U/L    AST (SGOT) 22 1 - 32 U/L    ALT (SGPT) 16 1 - 33 U/L   Lipid panel    Specimen: Blood   Result Value Ref Range    Total Cholesterol 226 (H) 0 - 200 mg/dL    Triglycerides 87 0 - 150 mg/dL    HDL Cholesterol 61 (H) 40 - 60 mg/dL    VLDL Cholesterol Curly 15 5 - 40 mg/dL    LDL Chol Calc (NIH) 150 (H) 0 - 100 mg/dL   TSH    Specimen: Blood   Result Value Ref Range    TSH 2.010 0.270 - 4.200 uIU/mL   CBC and Differential    Specimen: Blood   Result Value Ref Range    WBC 5.33 3.40 - 10.80 10*3/mm3    RBC 4.74 3.77 - 5.28 10*6/mm3    Hemoglobin 13.3 12.0 - 15.9 g/dL    Hematocrit 41.7 34.0 - 46.6 %    MCV 88.0 79.0 - 97.0 fL    MCH 28.1 26.6 - 33.0 pg    MCHC 31.9 31.5 - 35.7 g/dL    RDW 13.0 12.3 - 15.4 %    Platelets 271 140 - 450 10*3/mm3 "    Neutrophil Rel % 52.3 42.7 - 76.0 %    Lymphocyte Rel % 38.5 19.6 - 45.3 %    Monocyte Rel % 8.1 5.0 - 12.0 %    Eosinophil Rel % 0.0 (L) 0.3 - 6.2 %    Basophil Rel % 0.9 0.0 - 1.5 %    Neutrophils Absolute 2.79 1.70 - 7.00 10*3/mm3    Lymphocytes Absolute 2.05 0.70 - 3.10 10*3/mm3    Monocytes Absolute 0.43 0.10 - 0.90 10*3/mm3    Eosinophils Absolute 0.00 0.00 - 0.40 10*3/mm3    Basophils Absolute 0.05 0.00 - 0.20 10*3/mm3    Immature Granulocyte Rel % 0.2 0.0 - 0.5 %    Immature Grans Absolute 0.01 0.00 - 0.05 10*3/mm3    nRBC 0.0 0.0 - 0.2 /100 WBC         The following portions of the patient's history were reviewed and updated as appropriate: allergies, current medications, past family history, past medical history, past social history, past surgical history and problem list.    Review of Systems   Constitutional: Negative for unexpected weight change.   Respiratory: Negative for shortness of breath.    Cardiovascular: Negative for chest pain and palpitations.   Endocrine: Negative for cold intolerance, heat intolerance, polydipsia, polyphagia and polyuria.   Psychiatric/Behavioral: Negative for behavioral problems.       Objective   Physical Exam  Vitals and nursing note reviewed.   Constitutional:       Appearance: Normal appearance. She is well-developed.   Neck:      Vascular: No carotid bruit.   Cardiovascular:      Rate and Rhythm: Normal rate and regular rhythm.   Pulmonary:      Effort: Pulmonary effort is normal.      Breath sounds: Normal breath sounds.   Neurological:      Mental Status: She is alert and oriented to person, place, and time.   Psychiatric:         Mood and Affect: Mood normal.         Behavior: Behavior normal.         Thought Content: Thought content normal.         Judgment: Judgment normal.         Assessment & Plan   Diagnoses and all orders for this visit:    1. Mixed hyperlipidemia (Primary)  -     Comprehensive metabolic panel  -     Lipid panel  -     CBC and  Differential  -     TSH  -     pravastatin (PRAVACHOL) 40 MG tablet; Take 1 tablet by mouth Daily.  Dispense: 90 tablet; Refill: 3    2. Edema, unspecified type  -     furosemide (LASIX) 20 MG tablet; Take 1 tablet by mouth Daily.  Dispense: 90 tablet; Refill: 3    3. Screening for colorectal cancer  -     Cologuard - Stool, Per Rectum

## 2022-07-30 LAB
ALBUMIN SERPL-MCNC: 4.3 G/DL (ref 3.8–4.8)
ALBUMIN/GLOB SERPL: 2 {RATIO} (ref 1.2–2.2)
ALP SERPL-CCNC: 112 IU/L (ref 44–121)
ALT SERPL-CCNC: 17 IU/L (ref 0–32)
AST SERPL-CCNC: 25 IU/L (ref 0–40)
BASOPHILS # BLD AUTO: 0 X10E3/UL (ref 0–0.2)
BASOPHILS NFR BLD AUTO: 1 %
BILIRUB SERPL-MCNC: 0.4 MG/DL (ref 0–1.2)
BUN SERPL-MCNC: 17 MG/DL (ref 8–27)
BUN/CREAT SERPL: 20 (ref 12–28)
CALCIUM SERPL-MCNC: 8.8 MG/DL (ref 8.7–10.3)
CHLORIDE SERPL-SCNC: 103 MMOL/L (ref 96–106)
CHOLEST SERPL-MCNC: 209 MG/DL (ref 100–199)
CO2 SERPL-SCNC: 25 MMOL/L (ref 20–29)
CREAT SERPL-MCNC: 0.83 MG/DL (ref 0.57–1)
EGFRCR SERPLBLD CKD-EPI 2021: 80 ML/MIN/1.73
EOSINOPHIL # BLD AUTO: 0.1 X10E3/UL (ref 0–0.4)
EOSINOPHIL NFR BLD AUTO: 2 %
ERYTHROCYTE [DISTWIDTH] IN BLOOD BY AUTOMATED COUNT: 13.6 % (ref 11.7–15.4)
GLOBULIN SER CALC-MCNC: 2.2 G/DL (ref 1.5–4.5)
GLUCOSE SERPL-MCNC: 84 MG/DL (ref 65–99)
HCT VFR BLD AUTO: 41.1 % (ref 34–46.6)
HDLC SERPL-MCNC: 55 MG/DL
HGB BLD-MCNC: 13.3 G/DL (ref 11.1–15.9)
IMM GRANULOCYTES # BLD AUTO: 0 X10E3/UL (ref 0–0.1)
IMM GRANULOCYTES NFR BLD AUTO: 0 %
LDLC SERPL CALC-MCNC: 142 MG/DL (ref 0–99)
LYMPHOCYTES # BLD AUTO: 2.1 X10E3/UL (ref 0.7–3.1)
LYMPHOCYTES NFR BLD AUTO: 33 %
MCH RBC QN AUTO: 27 PG (ref 26.6–33)
MCHC RBC AUTO-ENTMCNC: 32.4 G/DL (ref 31.5–35.7)
MCV RBC AUTO: 84 FL (ref 79–97)
MONOCYTES # BLD AUTO: 0.5 X10E3/UL (ref 0.1–0.9)
MONOCYTES NFR BLD AUTO: 8 %
NEUTROPHILS # BLD AUTO: 3.6 X10E3/UL (ref 1.4–7)
NEUTROPHILS NFR BLD AUTO: 56 %
PLATELET # BLD AUTO: 289 X10E3/UL (ref 150–450)
POTASSIUM SERPL-SCNC: 4.4 MMOL/L (ref 3.5–5.2)
PROT SERPL-MCNC: 6.5 G/DL (ref 6–8.5)
RBC # BLD AUTO: 4.92 X10E6/UL (ref 3.77–5.28)
SODIUM SERPL-SCNC: 141 MMOL/L (ref 134–144)
TRIGL SERPL-MCNC: 67 MG/DL (ref 0–149)
TSH SERPL DL<=0.005 MIU/L-ACNC: 1.16 UIU/ML (ref 0.45–4.5)
VLDLC SERPL CALC-MCNC: 12 MG/DL (ref 5–40)
WBC # BLD AUTO: 6.3 X10E3/UL (ref 3.4–10.8)

## 2022-08-01 ENCOUNTER — TELEPHONE (OUTPATIENT)
Dept: FAMILY MEDICINE CLINIC | Facility: CLINIC | Age: 62
End: 2022-08-01

## 2022-08-01 DIAGNOSIS — L71.9 ACNE ROSACEA: Primary | ICD-10-CM

## 2022-08-01 RX ORDER — DOXYCYCLINE 100 MG/1
100 TABLET ORAL 2 TIMES DAILY
Qty: 180 TABLET | Refills: 1 | Status: SHIPPED | OUTPATIENT
Start: 2022-08-01 | End: 2023-01-05

## 2022-08-01 NOTE — TELEPHONE ENCOUNTER
----- Message from Cheri Cross sent at 8/1/2022  3:40 PM EDT -----  Regarding: Refill request   [facility] states they have not received a request for a refill for Doxycycline. I’m not sure if it needs to be resent or not. Thanks

## 2022-08-16 LAB — NONINV COLON CA DNA+OCC BLD SCRN STL QL: POSITIVE

## 2022-08-22 ENCOUNTER — TELEPHONE (OUTPATIENT)
Dept: FAMILY MEDICINE CLINIC | Facility: CLINIC | Age: 62
End: 2022-08-22

## 2022-08-22 DIAGNOSIS — R19.5 POSITIVE COLORECTAL CANCER SCREENING USING COLOGUARD TEST: Primary | ICD-10-CM

## 2022-08-22 NOTE — TELEPHONE ENCOUNTER
----- Message from JOSHUA Trejo sent at 8/17/2022 10:22 AM EDT -----  Cologuard positive. Needs referral for colonoscopy

## 2022-09-02 ENCOUNTER — PRE-PROCEDURE SCREENING (OUTPATIENT)
Dept: GASTROENTEROLOGY | Facility: CLINIC | Age: 62
End: 2022-09-02

## 2022-09-27 DIAGNOSIS — K21.9 GASTROESOPHAGEAL REFLUX DISEASE: ICD-10-CM

## 2022-09-28 RX ORDER — OMEPRAZOLE 40 MG/1
CAPSULE, DELAYED RELEASE ORAL
Qty: 90 CAPSULE | Refills: 1 | Status: SHIPPED | OUTPATIENT
Start: 2022-09-28 | End: 2023-04-04 | Stop reason: SDUPTHER

## 2022-10-18 ENCOUNTER — TELEPHONE (OUTPATIENT)
Dept: GASTROENTEROLOGY | Facility: CLINIC | Age: 62
End: 2022-10-18

## 2022-10-18 NOTE — TELEPHONE ENCOUNTER
Caller: Cheri Cross    Relationship to patient: Self    Best call back number: [phone]    Chief complaint: SCHEDULE    Type of visit: COLONOSCOPY    Requested date: NEXT AVAILABLE     Additional notes:PT HAS ALREADY MAILED IN PRESCREENING

## 2022-10-24 NOTE — TELEPHONE ENCOUNTER
Hub staff attempted to follow warm transfer process and was unsuccessful     Caller: Cheri Cross    Relationship to patient: Self    Best call back number: [phone]    Patient is needing: PT CALLING TO SCHEDULE COLONOSCOPY

## 2022-11-21 ENCOUNTER — APPOINTMENT (OUTPATIENT)
Dept: WOMENS IMAGING | Facility: HOSPITAL | Age: 62
End: 2022-11-21

## 2022-11-21 PROCEDURE — 77063 BREAST TOMOSYNTHESIS BI: CPT | Performed by: RADIOLOGY

## 2022-11-21 PROCEDURE — 77067 SCR MAMMO BI INCL CAD: CPT | Performed by: RADIOLOGY

## 2022-12-27 ENCOUNTER — TELEPHONE (OUTPATIENT)
Dept: FAMILY MEDICINE CLINIC | Facility: CLINIC | Age: 62
End: 2022-12-27

## 2022-12-27 NOTE — TELEPHONE ENCOUNTER
Caller: Cheri Cross    Relationship: Self    Best call back number: [phone]    What medication are you requesting: BREO INHALER AND SINGULAIR     What are your current symptoms: ASTHMA     How long have you been experiencing symptoms: LAST Wednesday    Have you had these symptoms before:    [x] Yes  [] No    Have you been treated for these symptoms before:   [x] Yes  [] No    If a prescription is needed, what is your preferred pharmacy and phone number:  [id number] [place], [place] - [address] AT [facility] - [phone] [facility] [phone]

## 2022-12-30 ENCOUNTER — TELEPHONE (OUTPATIENT)
Dept: FAMILY MEDICINE CLINIC | Facility: CLINIC | Age: 62
End: 2022-12-30

## 2022-12-30 DIAGNOSIS — J45.30 MILD PERSISTENT ASTHMA WITHOUT COMPLICATION: ICD-10-CM

## 2022-12-30 RX ORDER — ALBUTEROL SULFATE 90 UG/1
2 AEROSOL, METERED RESPIRATORY (INHALATION) EVERY 4 HOURS PRN
Qty: 1 G | Refills: 5 | Status: SHIPPED | OUTPATIENT
Start: 2022-12-30 | End: 2023-01-05 | Stop reason: SDUPTHER

## 2022-12-30 NOTE — TELEPHONE ENCOUNTER
----- Message from Cheri Cross sent at 12/30/2022 11:47 AM EST -----  Regarding: medications  Contact: [phone]  Is it possible to get a prescription for a regular inhaler called in? I made an appointment for Jacquelin for next week. I knew it had been a few years since I had the Breo inhaler. That could have been from a [facility] visit. Thanks

## 2022-12-30 NOTE — TELEPHONE ENCOUNTER
PATIENT CALLING BACK SHE HASN'T HEARD ANYTHING REGARDING THIS ISSUE AND REALLY NEEDS THIS CALLED IN. PLEASE F/U ASAP.     PATIENT> [phone]

## 2022-12-30 NOTE — TELEPHONE ENCOUNTER
Jacquelin has never written either of these medications.  The inhaler and the singular show that they have not been written since 2017.  Patient also did not report these medications at her office visit.  Patient will need an appointment to have medications written by Jacquelin.

## 2023-01-03 ENCOUNTER — TELEPHONE (OUTPATIENT)
Dept: GASTROENTEROLOGY | Facility: CLINIC | Age: 63
End: 2023-01-03

## 2023-01-03 NOTE — TELEPHONE ENCOUNTER
Caller: Cheri Cross    Relationship to patient: Self    Best call back number: [phone]    Patient is needing: TO SCHEDULE SCOPE, PATIENT HAS BEEN TRYING TO SCHEDULE SCOPE SINCE September AND NEVER STATES SHE NEVER RECEIVED A CALL BACK.     CAN SCHEDULE SCOPE ANY DAY OF THE WEEK AND PREFERS MORNINGS

## 2023-01-05 ENCOUNTER — OFFICE VISIT (OUTPATIENT)
Dept: FAMILY MEDICINE CLINIC | Facility: CLINIC | Age: 63
End: 2023-01-05
Payer: COMMERCIAL

## 2023-01-05 ENCOUNTER — TELEPHONE (OUTPATIENT)
Dept: FAMILY MEDICINE CLINIC | Facility: CLINIC | Age: 63
End: 2023-01-05

## 2023-01-05 VITALS
TEMPERATURE: 97.5 F | SYSTOLIC BLOOD PRESSURE: 110 MMHG | BODY MASS INDEX: 27.15 KG/M2 | OXYGEN SATURATION: 99 % | RESPIRATION RATE: 16 BRPM | DIASTOLIC BLOOD PRESSURE: 64 MMHG | HEIGHT: 67 IN | HEART RATE: 80 BPM | WEIGHT: 173 LBS

## 2023-01-05 DIAGNOSIS — F41.9 ANXIETY: ICD-10-CM

## 2023-01-05 DIAGNOSIS — L71.9 ACNE ROSACEA: ICD-10-CM

## 2023-01-05 DIAGNOSIS — K21.9 GASTROESOPHAGEAL REFLUX DISEASE: ICD-10-CM

## 2023-01-05 DIAGNOSIS — R60.9 EDEMA, UNSPECIFIED TYPE: ICD-10-CM

## 2023-01-05 DIAGNOSIS — J45.30 MILD PERSISTENT ASTHMA WITHOUT COMPLICATION: Primary | ICD-10-CM

## 2023-01-05 DIAGNOSIS — J45.30 MILD PERSISTENT ASTHMA WITHOUT COMPLICATION: ICD-10-CM

## 2023-01-05 DIAGNOSIS — E78.2 MIXED HYPERLIPIDEMIA: ICD-10-CM

## 2023-01-05 PROCEDURE — 99214 OFFICE O/P EST MOD 30 MIN: CPT | Performed by: NURSE PRACTITIONER

## 2023-01-05 RX ORDER — PRAVASTATIN SODIUM 40 MG
40 TABLET ORAL DAILY
Qty: 90 TABLET | Refills: 3 | Status: CANCELLED | OUTPATIENT
Start: 2023-01-05

## 2023-01-05 RX ORDER — HYDROXYZINE HYDROCHLORIDE 25 MG/1
25 TABLET, FILM COATED ORAL 3 TIMES DAILY PRN
Qty: 90 TABLET | Refills: 3 | Status: CANCELLED | OUTPATIENT
Start: 2023-01-05

## 2023-01-05 RX ORDER — OMEPRAZOLE 40 MG/1
40 CAPSULE, DELAYED RELEASE ORAL DAILY
Qty: 90 CAPSULE | Refills: 1 | Status: CANCELLED | OUTPATIENT
Start: 2023-01-05

## 2023-01-05 RX ORDER — ALBUTEROL SULFATE 90 UG/1
2 AEROSOL, METERED RESPIRATORY (INHALATION) EVERY 4 HOURS PRN
Qty: 1 G | Refills: 11 | Status: SHIPPED | OUTPATIENT
Start: 2023-01-05

## 2023-01-05 RX ORDER — FUROSEMIDE 20 MG/1
20 TABLET ORAL DAILY
Qty: 90 TABLET | Refills: 3 | Status: CANCELLED | OUTPATIENT
Start: 2023-01-05

## 2023-01-05 RX ORDER — DOXYCYCLINE 100 MG/1
100 TABLET ORAL 2 TIMES DAILY
Qty: 180 TABLET | Refills: 1 | Status: SHIPPED | OUTPATIENT
Start: 2023-01-05

## 2023-01-05 RX ORDER — FLUTICASONE FUROATE AND VILANTEROL 100; 25 UG/1; UG/1
1 POWDER RESPIRATORY (INHALATION)
Qty: 60 EACH | Refills: 11 | Status: SHIPPED | OUTPATIENT
Start: 2023-01-05 | End: 2023-01-05

## 2023-01-05 RX ORDER — MONTELUKAST SODIUM 10 MG/1
10 TABLET ORAL NIGHTLY
Qty: 90 TABLET | Refills: 3 | Status: SHIPPED | OUTPATIENT
Start: 2023-01-05

## 2023-01-05 RX ORDER — FLUTICASONE PROPIONATE AND SALMETEROL 100; 50 UG/1; UG/1
1 POWDER RESPIRATORY (INHALATION)
Qty: 60 EACH | Refills: 5 | Status: SHIPPED | OUTPATIENT
Start: 2023-01-05 | End: 2023-09-07 | Stop reason: SDUPTHER

## 2023-01-05 NOTE — PROGRESS NOTES
Subjective   Cheri Cross is a 62 y.o. female.     History of Present Illness   Answers for HPI/ROS submitted by the patient on 1/4/2023  Please describe your symptoms.: Asthma  Have you had these symptoms before?: Yes  How long have you been having these symptoms?: Greater than 2 weeks  Please list any medications you are currently taking for this condition.: Albuteral  What is the primary reason for your visit?: Other     Since the last visit, she has overall felt well.  She has asthma that has not been well controlled for several weeks but is improving.  She had previously used Breo and Singulair in the past and feels that it worked well. She only feels that she needs it certain times of the year for a short period.  she has been compliant with current medications have reviewed them.  The patient denies medication side effects.  Will refill medications. /64   Pulse 80   Temp 97.5 °F (36.4 °C)   Resp 16   Ht 170.2 cm (67\")   Wt 78.5 kg (173 lb)   SpO2 99%   BMI 27.10 kg/m²     Results for orders placed or performed in visit on 07/29/22   Comprehensive metabolic panel    Specimen: Blood   Result Value Ref Range    Glucose 84 65 - 99 mg/dL    BUN 17 8 - 27 mg/dL    Creatinine 0.83 0.57 - 1.00 mg/dL    EGFR Result 80 >59 mL/min/1.73    BUN/Creatinine Ratio 20 12 - 28    Sodium 141 134 - 144 mmol/L    Potassium 4.4 3.5 - 5.2 mmol/L    Chloride 103 96 - 106 mmol/L    Total CO2 25 20 - 29 mmol/L    Calcium 8.8 8.7 - 10.3 mg/dL    Total Protein 6.5 6.0 - 8.5 g/dL    Albumin 4.3 3.8 - 4.8 g/dL    Globulin 2.2 1.5 - 4.5 g/dL    A/G Ratio 2.0 1.2 - 2.2    Total Bilirubin 0.4 0.0 - 1.2 mg/dL    Alkaline Phosphatase 112 44 - 121 IU/L    AST (SGOT) 25 0 - 40 IU/L    ALT (SGPT) 17 0 - 32 IU/L   Lipid panel    Specimen: Blood   Result Value Ref Range    Total Cholesterol 209 (H) 100 - 199 mg/dL    Triglycerides 67 0 - 149 mg/dL    HDL Cholesterol 55 >39 mg/dL    VLDL Cholesterol Curly 12 5 - 40 mg/dL    LDL Chol  Calc (NIH) 142 (H) 0 - 99 mg/dL   TSH    Specimen: Blood   Result Value Ref Range    TSH 1.160 0.450 - 4.500 uIU/mL   Cologuard - Stool, Per Rectum    Specimen: Per Rectum; Stool   Result Value Ref Range    Cologuard Positive (A) Negative   CBC and Differential    Specimen: Blood   Result Value Ref Range    WBC 6.3 3.4 - 10.8 x10E3/uL    RBC 4.92 3.77 - 5.28 x10E6/uL    Hemoglobin 13.3 11.1 - 15.9 g/dL    Hematocrit 41.1 34.0 - 46.6 %    MCV 84 79 - 97 fL    MCH 27.0 26.6 - 33.0 pg    MCHC 32.4 31.5 - 35.7 g/dL    RDW 13.6 11.7 - 15.4 %    Platelets 289 150 - 450 x10E3/uL    Neutrophil Rel % 56 Not Estab. %    Lymphocyte Rel % 33 Not Estab. %    Monocyte Rel % 8 Not Estab. %    Eosinophil Rel % 2 Not Estab. %    Basophil Rel % 1 Not Estab. %    Neutrophils Absolute 3.6 1.4 - 7.0 x10E3/uL    Lymphocytes Absolute 2.1 0.7 - 3.1 x10E3/uL    Monocytes Absolute 0.5 0.1 - 0.9 x10E3/uL    Eosinophils Absolute 0.1 0.0 - 0.4 x10E3/uL    Basophils Absolute 0.0 0.0 - 0.2 x10E3/uL    Immature Granulocyte Rel % 0 Not Estab. %    Immature Grans Absolute 0.0 0.0 - 0.1 x10E3/uL         The following portions of the patient's history were reviewed and updated as appropriate: allergies, current medications, past family history, past medical history, past social history, past surgical history and problem list.    Review of Systems   Constitutional: Negative for unexpected weight change.   Respiratory: Positive for wheezing. Negative for shortness of breath.    Cardiovascular: Negative for chest pain and palpitations.   Psychiatric/Behavioral: Negative for behavioral problems.       Objective   Physical Exam  Vitals and nursing note reviewed.   Constitutional:       Appearance: Normal appearance. She is well-developed.   Cardiovascular:      Rate and Rhythm: Normal rate and regular rhythm.   Pulmonary:      Effort: Pulmonary effort is normal.      Breath sounds: Normal breath sounds.   Neurological:      Mental Status: She is alert and  oriented to person, place, and time.   Psychiatric:         Mood and Affect: Mood normal.         Behavior: Behavior normal.         Thought Content: Thought content normal.         Judgment: Judgment normal.         Assessment & Plan   Diagnoses and all orders for this visit:    1. Gastroesophageal reflux disease    2. Mixed hyperlipidemia    3. Anxiety    4. Edema, unspecified type    5. Mild persistent asthma without complication  -     albuterol sulfate  (90 Base) MCG/ACT inhaler; Inhale 2 puffs Every 4 (Four) Hours As Needed for Wheezing.  Dispense: 1 g; Refill: 11    6. Acne rosacea  -     doxycycline (ADOXA) 100 MG tablet; Take 1 tablet by mouth 2 (Two) Times a Day.  Dispense: 180 tablet; Refill: 1    Other orders  -     Fluticasone Furoate-Vilanterol (Breo Ellipta) 100-25 MCG/ACT aerosol powder ; Inhale 1 puff Daily.  Dispense: 60 each; Refill: 11  -     montelukast (Singulair) 10 MG tablet; Take 1 tablet by mouth Every Night.  Dispense: 90 tablet; Refill: 3

## 2023-01-06 ENCOUNTER — PREP FOR SURGERY (OUTPATIENT)
Dept: OTHER | Facility: HOSPITAL | Age: 63
End: 2023-01-06
Payer: COMMERCIAL

## 2023-01-06 DIAGNOSIS — R19.5 POSITIVE COLORECTAL CANCER SCREENING USING COLOGUARD TEST: Primary | ICD-10-CM

## 2023-01-20 ENCOUNTER — TELEPHONE (OUTPATIENT)
Dept: GASTROENTEROLOGY | Facility: CLINIC | Age: 63
End: 2023-01-20
Payer: COMMERCIAL

## 2023-01-20 NOTE — TELEPHONE ENCOUNTER
Call to pt.  Advise may also search bisacodyl.  Request pharmacist's assistance.  Verb understanding.

## 2023-01-20 NOTE — TELEPHONE ENCOUNTER
Caller: Cheri Cross    Relationship to patient: Self    Best call back number: [phone]    Patient is needing: PATIENT IS HAVING AN ISSUE FINDING THE DULCOLAX FOR HER UPCOMING COLONOSCOPY ON 01/24/23. SHE NEEDS SUGGESTIONS ON WHAT ELSE SHE CAN GET FOR THE PREP BEFORE HER APPT. I WAS UNABLE TO WARM TRANSFER/ NO ANSWER. PLEASE REVIEW AND CONTACT PATIENT ON POSSIBLE SUBSTITUTIONS.

## 2023-01-24 ENCOUNTER — HOSPITAL ENCOUNTER (OUTPATIENT)
Facility: HOSPITAL | Age: 63
Setting detail: HOSPITAL OUTPATIENT SURGERY
Discharge: HOME | End: 2023-01-24
Attending: INTERNAL MEDICINE | Admitting: INTERNAL MEDICINE
Payer: COMMERCIAL

## 2023-01-24 ENCOUNTER — ANESTHESIA (OUTPATIENT)
Dept: GASTROENTEROLOGY | Facility: HOSPITAL | Age: 63
End: 2023-01-24
Payer: COMMERCIAL

## 2023-01-24 ENCOUNTER — ANESTHESIA EVENT (OUTPATIENT)
Dept: GASTROENTEROLOGY | Facility: HOSPITAL | Age: 63
End: 2023-01-24
Payer: COMMERCIAL

## 2023-01-24 VITALS
WEIGHT: 170.8 LBS | SYSTOLIC BLOOD PRESSURE: 130 MMHG | DIASTOLIC BLOOD PRESSURE: 81 MMHG | OXYGEN SATURATION: 97 % | HEART RATE: 66 BPM | RESPIRATION RATE: 16 BRPM | BODY MASS INDEX: 27.45 KG/M2 | TEMPERATURE: 98.4 F | HEIGHT: 66 IN

## 2023-01-24 PROCEDURE — 45378 DIAGNOSTIC COLONOSCOPY: CPT | Performed by: INTERNAL MEDICINE

## 2023-01-24 PROCEDURE — 25010000002 PROPOFOL 10 MG/ML EMULSION: Performed by: NURSE ANESTHETIST, CERTIFIED REGISTERED

## 2023-01-24 PROCEDURE — 25010000002 PHENYLEPHRINE 10 MG/ML SOLUTION: Performed by: NURSE ANESTHETIST, CERTIFIED REGISTERED

## 2023-01-24 PROCEDURE — S0260 H&P FOR SURGERY: HCPCS | Performed by: INTERNAL MEDICINE

## 2023-01-24 RX ORDER — PHENYLEPHRINE HYDROCHLORIDE 10 MG/ML
INJECTION INTRAVENOUS AS NEEDED
Status: DISCONTINUED | OUTPATIENT
Start: 2023-01-24 | End: 2023-01-24 | Stop reason: SURG

## 2023-01-24 RX ORDER — SODIUM CHLORIDE 9 MG/ML
40 INJECTION, SOLUTION INTRAVENOUS AS NEEDED
Status: DISCONTINUED | OUTPATIENT
Start: 2023-01-24 | End: 2023-01-24 | Stop reason: HOSPADM

## 2023-01-24 RX ORDER — SODIUM CHLORIDE 0.9 % (FLUSH) 0.9 %
10 SYRINGE (ML) INJECTION EVERY 12 HOURS SCHEDULED
Status: DISCONTINUED | OUTPATIENT
Start: 2023-01-24 | End: 2023-01-24 | Stop reason: HOSPADM

## 2023-01-24 RX ORDER — SODIUM CHLORIDE 0.9 % (FLUSH) 0.9 %
10 SYRINGE (ML) INJECTION AS NEEDED
Status: DISCONTINUED | OUTPATIENT
Start: 2023-01-24 | End: 2023-01-24 | Stop reason: HOSPADM

## 2023-01-24 RX ORDER — LIDOCAINE HYDROCHLORIDE 10 MG/ML
INJECTION, SOLUTION EPIDURAL; INFILTRATION; INTRACAUDAL; PERINEURAL AS NEEDED
Status: DISCONTINUED | OUTPATIENT
Start: 2023-01-24 | End: 2023-01-24 | Stop reason: SURG

## 2023-01-24 RX ORDER — PROPOFOL 10 MG/ML
VIAL (ML) INTRAVENOUS AS NEEDED
Status: DISCONTINUED | OUTPATIENT
Start: 2023-01-24 | End: 2023-01-24 | Stop reason: SURG

## 2023-01-24 RX ORDER — SODIUM CHLORIDE, SODIUM LACTATE, POTASSIUM CHLORIDE, CALCIUM CHLORIDE 600; 310; 30; 20 MG/100ML; MG/100ML; MG/100ML; MG/100ML
30 INJECTION, SOLUTION INTRAVENOUS CONTINUOUS PRN
Status: DISCONTINUED | OUTPATIENT
Start: 2023-01-24 | End: 2023-01-24 | Stop reason: HOSPADM

## 2023-01-24 RX ADMIN — PROPOFOL 70 MG: 10 INJECTION, EMULSION INTRAVENOUS at 12:07

## 2023-01-24 RX ADMIN — PHENYLEPHRINE HYDROCHLORIDE 100 MCG: 10 INJECTION INTRAVENOUS at 12:14

## 2023-01-24 RX ADMIN — LIDOCAINE HYDROCHLORIDE 30 MG: 10 INJECTION, SOLUTION EPIDURAL; INFILTRATION; INTRACAUDAL; PERINEURAL at 12:07

## 2023-01-24 RX ADMIN — SODIUM CHLORIDE, POTASSIUM CHLORIDE, SODIUM LACTATE AND CALCIUM CHLORIDE 30 ML/HR: 600; 310; 30; 20 INJECTION, SOLUTION INTRAVENOUS at 11:09

## 2023-01-24 RX ADMIN — PROPOFOL 200 MCG/KG/MIN: 10 INJECTION, EMULSION INTRAVENOUS at 12:07

## 2023-01-24 RX ADMIN — PHENYLEPHRINE HYDROCHLORIDE 100 MCG: 10 INJECTION INTRAVENOUS at 12:21

## 2023-01-24 NOTE — ANESTHESIA PREPROCEDURE EVALUATION
Anesthesia Evaluation     Patient summary reviewed and Nursing notes reviewed   NPO Solid Status: > 8 hours             Airway   Mallampati: II  TM distance: >3 FB  Neck ROM: full  No difficulty expected  Dental - normal exam     Pulmonary - normal exam   Cardiovascular - normal exam    ECG reviewed    (+) CAD,   (-) angina, HERNANDEZ      Neuro/Psych  (+) TIA,    GI/Hepatic/Renal/Endo    (+)   thyroid problem hypothyroidism    Musculoskeletal     Abdominal  - normal exam    Bowel sounds: normal.   Substance History      OB/GYN          Other                        Anesthesia Plan    ASA 3     MAC       Anesthetic plan, risks, benefits, and alternatives have been provided, discussed and informed consent has been obtained with: patient.        CODE STATUS:

## 2023-01-24 NOTE — ANESTHESIA POSTPROCEDURE EVALUATION
Patient: Cheri Cross    Procedure Summary     Date: 01/24/23 Room / Location:  [facility] /  [facility]    Anesthesia Start: 1204 Anesthesia Stop: 1233    Procedure: COLONOSCOPY to cecum into TI Diagnosis:       Positive colorectal cancer screening using Cologuard test      (Positive colorectal cancer screening using Cologuard test [R19.5])    Surgeons: Cary Hawkins MD Provider: Colt Brian MD    Anesthesia Type: MAC ASA Status: 3          Anesthesia Type: MAC    Vitals  Vitals Value Taken Time   /81 01/24/23 1250   Temp     Pulse 66 01/24/23 1250   Resp 16 01/24/23 1250   SpO2 97 % 01/24/23 1250           Post Anesthesia Care and Evaluation    Patient location during evaluation: PHASE II  Patient participation: complete - patient participated  Level of consciousness: awake and alert  Pain management: adequate    Airway patency: patent  Anesthetic complications: No anesthetic complications  PONV Status: none  Cardiovascular status: acceptable and hemodynamically stable  Respiratory status: acceptable, nonlabored ventilation and spontaneous ventilation  Hydration status: acceptable

## 2023-01-24 NOTE — H&P
[facility], [facility]  Pre Procedure History & Physical    Chief Complaint:   Positive cologuard    Subjective     HPI:   61 yo here today for colonoscopy.  Pt reports FH CRC in her younger brother.  Patient denies GI symptoms currrently.  No prior exam.    Past Medical History:   Past Medical History:   Diagnosis Date   • Asthma    • Hyperlipidemia    • Hyperthyroidism    • Syncope    • VT (ventricular tachycardia)        Past Surgical History:  Past Surgical History:   Procedure Laterality Date   • CARDIAC CATHETERIZATION N/A 3/16/2016    Procedure: Coronary angiography, Left heart catheterization, Left ventricular angiography;  Surgeon: Manjinder Cunha MD;  Location: [facility] CATH INVASIVE LOCATION;  Service:    • CARDIAC ELECTROPHYSIOLOGY PROCEDURE N/A 2016    Procedure: Ablation VT;  Surgeon: Luis Antonio Banuelos MD;  Location: [facility] CATH INVASIVE LOCATION;  Service:    •  SECTION     • HYSTERECTOMY     • OOPHORECTOMY         Family History:  Family History   Problem Relation Age of Onset   • Heart disease Mother    • Lung disease Father    • Lupus Sister    • Cancer Brother    • Breast cancer Paternal Aunt    • Cancer Brother        Social History:   reports that she has never smoked. She has never used smokeless tobacco. She reports that she does not drink alcohol and does not use drugs.    Medications:   Medications Prior to Admission   Medication Sig Dispense Refill Last Dose   • albuterol sulfate  (90 Base) MCG/ACT inhaler Inhale 2 puffs Every 4 (Four) Hours As Needed for Wheezing. 1 g 11 Past Week   • Calcium-Magnesium-Vitamin D (CALCIUM 500 PO) Take 600 mg by mouth 3 (three) times a day.   2023   • CLIMARA PRO 0.045-0.015 MG/DAY APPLY 1 PATCH TOPICALLY ONCE WEEKLY AS DIRECTED  11 2023   • doxycycline (ADOXA) 100 MG tablet Take 1 tablet by mouth 2 (Two) Times a Day. 180 tablet 1 2023   • Fluticasone-Salmeterol (ADVAIR/WIXELA) 100-50 MCG/ACT DISKUS Inhale 1  "puff 2 (Two) Times a Day. 60 each 5 Past Month   • furosemide (LASIX) 20 MG tablet Take 1 tablet by mouth Daily. 90 tablet 3 1/23/2023   • hydrOXYzine (ATARAX) 25 MG tablet Take 1 tablet by mouth 3 (Three) Times a Day As Needed for Anxiety. 90 tablet 3 1/23/2023   • metroNIDAZOLE (METROCREAM) 0.75 % cream APPLY TOPICALLY TO FACE TWICE DAILY AFTER GENTLE CLEANSING   Past Month   • montelukast (Singulair) 10 MG tablet Take 1 tablet by mouth Every Night. 90 tablet 3 Past Month   • omeprazole (priLOSEC) 40 MG capsule TAKE 1 CAPSULE BY MOUTH DAILY FOR GERD 90 capsule 1 1/23/2023   • pravastatin (PRAVACHOL) 40 MG tablet Take 1 tablet by mouth Daily. 90 tablet 3 1/23/2023       Allergies:  Nickel    ROS:    Pertinent items are noted in HPI, all other systems reviewed and negative     Objective     Blood pressure 135/88, pulse 73, temperature 98.4 °F (36.9 °C), temperature source Oral, resp. rate 16, height 167.6 cm (66\"), weight 77.5 kg (170 lb 12.8 oz), SpO2 98 %.    Physical Exam   Constitutional: Pt is oriented to person, place, and time and well-developed, well-nourished, and in no distress.   Mouth/Throat: Oropharynx is clear and moist.   Neck: Normal range of motion.   Cardiovascular: Normal rate, regular rhythm    Pulmonary/Chest: Effort normal    Abdominal: Soft. Nontender  Skin: Skin is warm and dry.   Psychiatric: Mood, memory, affect and judgment normal.     Assessment & Plan     Diagnosis:  FH CRC, + cologuard    Anticipated Surgical Procedure:  colonoscopy    The risks, benefits, and alternatives of this procedure have been discussed with the patient or the responsible party- the patient understands and agrees to proceed.                                                              "

## 2023-04-04 DIAGNOSIS — K21.9 GASTROESOPHAGEAL REFLUX DISEASE: ICD-10-CM

## 2023-04-06 RX ORDER — OMEPRAZOLE 40 MG/1
40 CAPSULE, DELAYED RELEASE ORAL DAILY
Qty: 90 CAPSULE | Refills: 2 | Status: SHIPPED | OUTPATIENT
Start: 2023-04-06

## 2023-08-28 DIAGNOSIS — E78.2 MIXED HYPERLIPIDEMIA: ICD-10-CM

## 2023-08-28 DIAGNOSIS — R60.9 EDEMA, UNSPECIFIED TYPE: ICD-10-CM

## 2023-08-28 RX ORDER — PRAVASTATIN SODIUM 40 MG
40 TABLET ORAL DAILY
Qty: 30 TABLET | Refills: 0 | Status: SHIPPED | OUTPATIENT
Start: 2023-08-28

## 2023-08-28 RX ORDER — FUROSEMIDE 20 MG/1
20 TABLET ORAL DAILY
Qty: 30 TABLET | Refills: 0 | Status: SHIPPED | OUTPATIENT
Start: 2023-08-28

## 2023-08-28 RX ORDER — PRAVASTATIN SODIUM 40 MG
40 TABLET ORAL DAILY
Qty: 90 TABLET | OUTPATIENT
Start: 2023-08-28

## 2023-08-28 NOTE — TELEPHONE ENCOUNTER
Caller: Cheri Cross HECTOR    Relationship: Self    Best call back number: [phone]     Requested Prescriptions:   Requested Prescriptions     Pending Prescriptions Disp Refills    pravastatin (PRAVACHOL) 40 MG tablet 90 tablet 3     Sig: Take 1 tablet by mouth Daily.    furosemide (LASIX) 20 MG tablet 90 tablet 3     Sig: Take 1 tablet by mouth Daily.        Pharmacy where request should be sent: [id number] [place], [place] - [address] [address] [facility] [address] [facility] [facility] [phone] [facility] [phone] FX     Last office visit with prescribing clinician: 1/5/2023   Last telemedicine visit with prescribing clinician: Visit date not found   Next office visit with prescribing clinician: 9/7/2023     Additional details provided by patient: PATIENT STATED SHE HAS THREE OF EACH OF THESE MEDICATIONS REMAINING.    PATIENT HAS THE SOONEST AVAILABLE APPOINTMENT FOR A MEDICATION REFILL, HOWEVER WILL RUN OUT BEFORE THEN.    Does the patient have less than a 3 day supply:  [x] Yes  [] No    Would you like a call back once the refill request has been completed: [x] Yes [] No    If the office needs to give you a call back, can they leave a voicemail: [x] Yes [] No    Nita Dubon Rep   08/28/23 15:55 EDT

## 2023-09-07 ENCOUNTER — OFFICE VISIT (OUTPATIENT)
Dept: FAMILY MEDICINE CLINIC | Facility: CLINIC | Age: 63
End: 2023-09-07
Payer: COMMERCIAL

## 2023-09-07 VITALS
RESPIRATION RATE: 16 BRPM | WEIGHT: 171 LBS | SYSTOLIC BLOOD PRESSURE: 118 MMHG | OXYGEN SATURATION: 100 % | HEIGHT: 66 IN | HEART RATE: 70 BPM | DIASTOLIC BLOOD PRESSURE: 78 MMHG | BODY MASS INDEX: 27.48 KG/M2

## 2023-09-07 DIAGNOSIS — R60.9 EDEMA, UNSPECIFIED TYPE: ICD-10-CM

## 2023-09-07 DIAGNOSIS — L71.9 ACNE ROSACEA: ICD-10-CM

## 2023-09-07 DIAGNOSIS — K21.9 GASTROESOPHAGEAL REFLUX DISEASE: ICD-10-CM

## 2023-09-07 DIAGNOSIS — E78.2 MIXED HYPERLIPIDEMIA: ICD-10-CM

## 2023-09-07 DIAGNOSIS — J45.30 MILD PERSISTENT ASTHMA WITHOUT COMPLICATION: ICD-10-CM

## 2023-09-07 LAB
ALBUMIN SERPL-MCNC: 4.6 G/DL (ref 3.5–5.2)
ALBUMIN/GLOB SERPL: 2.3 G/DL
ALP SERPL-CCNC: 101 U/L (ref 39–117)
ALT SERPL-CCNC: 17 U/L (ref 1–33)
AST SERPL-CCNC: 21 U/L (ref 1–32)
BASOPHILS # BLD AUTO: 0.03 10*3/MM3 (ref 0–0.2)
BASOPHILS NFR BLD AUTO: 0.6 % (ref 0–1.5)
BILIRUB SERPL-MCNC: 0.4 MG/DL (ref 0–1.2)
BUN SERPL-MCNC: 17 MG/DL (ref 8–23)
BUN/CREAT SERPL: 21.5 (ref 7–25)
CALCIUM SERPL-MCNC: 9.7 MG/DL (ref 8.6–10.5)
CHLORIDE SERPL-SCNC: 105 MMOL/L (ref 98–107)
CHOLEST SERPL-MCNC: 223 MG/DL (ref 0–200)
CO2 SERPL-SCNC: 28.4 MMOL/L (ref 22–29)
CREAT SERPL-MCNC: 0.79 MG/DL (ref 0.57–1)
EGFRCR SERPLBLD CKD-EPI 2021: 84.2 ML/MIN/1.73
EOSINOPHIL # BLD AUTO: 0 10*3/MM3 (ref 0–0.4)
EOSINOPHIL NFR BLD AUTO: 0 % (ref 0.3–6.2)
ERYTHROCYTE [DISTWIDTH] IN BLOOD BY AUTOMATED COUNT: 13.7 % (ref 12.3–15.4)
GLOBULIN SER CALC-MCNC: 2 GM/DL
GLUCOSE SERPL-MCNC: 96 MG/DL (ref 65–99)
HCT VFR BLD AUTO: 41.6 % (ref 34–46.6)
HDLC SERPL-MCNC: 63 MG/DL (ref 40–60)
HGB BLD-MCNC: 13.5 G/DL (ref 12–15.9)
IMM GRANULOCYTES # BLD AUTO: 0.01 10*3/MM3 (ref 0–0.05)
IMM GRANULOCYTES NFR BLD AUTO: 0.2 % (ref 0–0.5)
LDLC SERPL CALC-MCNC: 147 MG/DL (ref 0–100)
LYMPHOCYTES # BLD AUTO: 1.69 10*3/MM3 (ref 0.7–3.1)
LYMPHOCYTES NFR BLD AUTO: 32.8 % (ref 19.6–45.3)
MCH RBC QN AUTO: 27 PG (ref 26.6–33)
MCHC RBC AUTO-ENTMCNC: 32.5 G/DL (ref 31.5–35.7)
MCV RBC AUTO: 83.2 FL (ref 79–97)
MONOCYTES # BLD AUTO: 0.33 10*3/MM3 (ref 0.1–0.9)
MONOCYTES NFR BLD AUTO: 6.4 % (ref 5–12)
NEUTROPHILS # BLD AUTO: 3.09 10*3/MM3 (ref 1.7–7)
NEUTROPHILS NFR BLD AUTO: 60 % (ref 42.7–76)
NRBC BLD AUTO-RTO: 0 /100 WBC (ref 0–0.2)
PLATELET # BLD AUTO: 268 10*3/MM3 (ref 140–450)
POTASSIUM SERPL-SCNC: 4.6 MMOL/L (ref 3.5–5.2)
PROT SERPL-MCNC: 6.6 G/DL (ref 6–8.5)
RBC # BLD AUTO: 5 10*6/MM3 (ref 3.77–5.28)
SODIUM SERPL-SCNC: 143 MMOL/L (ref 136–145)
TRIGL SERPL-MCNC: 77 MG/DL (ref 0–150)
TSH SERPL DL<=0.005 MIU/L-ACNC: 1.94 UIU/ML (ref 0.27–4.2)
VLDLC SERPL CALC-MCNC: 13 MG/DL (ref 5–40)
WBC # BLD AUTO: 5.15 10*3/MM3 (ref 3.4–10.8)

## 2023-09-07 PROCEDURE — 99214 OFFICE O/P EST MOD 30 MIN: CPT | Performed by: NURSE PRACTITIONER

## 2023-09-07 RX ORDER — FUROSEMIDE 20 MG/1
20 TABLET ORAL DAILY
Qty: 90 TABLET | Refills: 3 | Status: SHIPPED | OUTPATIENT
Start: 2023-09-07

## 2023-09-07 RX ORDER — FLUTICASONE PROPIONATE AND SALMETEROL 100; 50 UG/1; UG/1
1 POWDER RESPIRATORY (INHALATION)
Qty: 60 EACH | Refills: 5 | Status: SHIPPED | OUTPATIENT
Start: 2023-09-07

## 2023-09-07 RX ORDER — MONTELUKAST SODIUM 10 MG/1
10 TABLET ORAL NIGHTLY
Qty: 90 TABLET | Refills: 3 | Status: SHIPPED | OUTPATIENT
Start: 2023-09-07

## 2023-09-07 RX ORDER — OMEPRAZOLE 40 MG/1
40 CAPSULE, DELAYED RELEASE ORAL DAILY
Qty: 90 CAPSULE | Refills: 3 | Status: SHIPPED | OUTPATIENT
Start: 2023-09-07

## 2023-09-07 RX ORDER — PRAVASTATIN SODIUM 40 MG
40 TABLET ORAL DAILY
Qty: 90 TABLET | Refills: 3 | Status: SHIPPED | OUTPATIENT
Start: 2023-09-07

## 2023-09-07 RX ORDER — ALBUTEROL SULFATE 90 UG/1
2 AEROSOL, METERED RESPIRATORY (INHALATION) EVERY 4 HOURS PRN
Qty: 1 G | Refills: 11 | Status: CANCELLED | OUTPATIENT
Start: 2023-09-07

## 2023-09-07 RX ORDER — DOXYCYCLINE 100 MG/1
100 TABLET ORAL 2 TIMES DAILY
Qty: 180 TABLET | Refills: 3 | Status: SHIPPED | OUTPATIENT
Start: 2023-09-07

## 2023-09-07 NOTE — PROGRESS NOTES
"Subjective   Cheri Cross is a 63 y.o. female.     History of Present Illness   Answers submitted by the patient for this visit:  Other (Submitted on 8/31/2023)  Please describe your symptoms.: Visit for prescriptions to be refilled.  Have you had these symptoms before?: Yes  How long have you been having these symptoms?: Greater than 2 weeks  Please list any medications you are currently taking for this condition.: On file at office  Primary Reason for Visit (Submitted on 8/31/2023)  What is the primary reason for your visit?: Other   Since the last visit, she has overall felt fairly well but has some increased anxiety and would like to restart sertraline that she has taken in the past.   She has GERD controlled on PPI Rx, Hyperlipidemia and working on this with diet and exercise, and asthma that has been well controlled on current regimen .  she has been compliant with current medications have reviewed them.  The patient denies medication side effects.  Will refill medications. /78   Pulse 70   Resp 16   Ht 167.6 cm (66\")   Wt 77.6 kg (171 lb)   SpO2 100%   BMI 27.60 kg/m²     Results for orders placed or performed in visit on 07/29/22   Comprehensive metabolic panel    Specimen: Blood   Result Value Ref Range    Glucose 84 65 - 99 mg/dL    BUN 17 8 - 27 mg/dL    Creatinine 0.83 0.57 - 1.00 mg/dL    EGFR Result 80 >59 mL/min/1.73    BUN/Creatinine Ratio 20 12 - 28    Sodium 141 134 - 144 mmol/L    Potassium 4.4 3.5 - 5.2 mmol/L    Chloride 103 96 - 106 mmol/L    Total CO2 25 20 - 29 mmol/L    Calcium 8.8 8.7 - 10.3 mg/dL    Total Protein 6.5 6.0 - 8.5 g/dL    Albumin 4.3 3.8 - 4.8 g/dL    Globulin 2.2 1.5 - 4.5 g/dL    A/G Ratio 2.0 1.2 - 2.2    Total Bilirubin 0.4 0.0 - 1.2 mg/dL    Alkaline Phosphatase 112 44 - 121 IU/L    AST (SGOT) 25 0 - 40 IU/L    ALT (SGPT) 17 0 - 32 IU/L   Lipid panel    Specimen: Blood   Result Value Ref Range    Total Cholesterol 209 (H) 100 - 199 mg/dL    Triglycerides 67 " 0 - 149 mg/dL    HDL Cholesterol 55 >39 mg/dL    VLDL Cholesterol Curly 12 5 - 40 mg/dL    LDL Chol Calc ([facility]) 142 (H) 0 - 99 mg/dL   TSH    Specimen: Blood   Result Value Ref Range    TSH 1.160 0.450 - 4.500 uIU/mL   Cologuard - Stool, Per Rectum    Specimen: Per Rectum; Stool   Result Value Ref Range    Cologuard Positive (A) Negative   CBC and Differential    Specimen: Blood   Result Value Ref Range    WBC 6.3 3.4 - 10.8 x10E3/uL    RBC 4.92 3.77 - 5.28 x10E6/uL    Hemoglobin 13.3 11.1 - 15.9 g/dL    Hematocrit 41.1 34.0 - 46.6 %    MCV 84 79 - 97 fL    MCH 27.0 26.6 - 33.0 pg    MCHC 32.4 31.5 - 35.7 g/dL    RDW 13.6 11.7 - 15.4 %    Platelets 289 150 - 450 x10E3/uL    Neutrophil Rel % 56 Not Estab. %    Lymphocyte Rel % 33 Not Estab. %    Monocyte Rel % 8 Not Estab. %    Eosinophil Rel % 2 Not Estab. %    Basophil Rel % 1 Not Estab. %    Neutrophils Absolute 3.6 1.4 - 7.0 x10E3/uL    Lymphocytes Absolute 2.1 0.7 - 3.1 x10E3/uL    Monocytes Absolute 0.5 0.1 - 0.9 x10E3/uL    Eosinophils Absolute 0.1 0.0 - 0.4 x10E3/uL    Basophils Absolute 0.0 0.0 - 0.2 x10E3/uL    Immature Granulocyte Rel % 0 Not Estab. %    Immature Grans Absolute 0.0 0.0 - 0.1 x10E3/uL         The following portions of the patient's history were reviewed and updated as appropriate: allergies, current medications, past family history, past medical history, past social history, past surgical history, and problem list.    Review of Systems   Constitutional:  Negative for unexpected weight change.   Respiratory:  Negative for shortness of breath.    Cardiovascular:  Negative for chest pain and palpitations.   Psychiatric/Behavioral:  Positive for sleep disturbance. Negative for behavioral problems. The patient is nervous/anxious.      Objective   Physical Exam  Vitals and nursing note reviewed.   Constitutional:       Appearance: Normal appearance. She is well-developed.   Neck:      Vascular: No carotid bruit.   Cardiovascular:      Rate and  Rhythm: Normal rate and regular rhythm.   Pulmonary:      Effort: Pulmonary effort is normal.      Breath sounds: Normal breath sounds.   Neurological:      Mental Status: She is alert and oriented to person, place, and time.   Psychiatric:         Mood and Affect: Mood normal.         Behavior: Behavior normal.         Thought Content: Thought content normal.         Judgment: Judgment normal.       Assessment & Plan   Diagnoses and all orders for this visit:    1. Mild persistent asthma without complication  -     Fluticasone-Salmeterol (ADVAIR/WIXELA) 100-50 MCG/ACT DISKUS; Inhale 1 puff 2 (Two) Times a Day.  Dispense: 60 each; Refill: 5    2. Acne rosacea  -     doxycycline (ADOXA) 100 MG tablet; Take 1 tablet by mouth 2 (Two) Times a Day.  Dispense: 180 tablet; Refill: 3    3. Edema, unspecified type  -     furosemide (LASIX) 20 MG tablet; Take 1 tablet by mouth Daily.  Dispense: 90 tablet; Refill: 3  -     Comprehensive metabolic panel  -     Lipid panel  -     CBC and Differential  -     TSH    4. Gastroesophageal reflux disease  -     omeprazole (priLOSEC) 40 MG capsule; Take 1 capsule by mouth Daily.  Dispense: 90 capsule; Refill: 3    5. Mixed hyperlipidemia  -     pravastatin (PRAVACHOL) 40 MG tablet; Take 1 tablet by mouth Daily.  Dispense: 90 tablet; Refill: 3  -     Comprehensive metabolic panel  -     Lipid panel  -     CBC and Differential  -     TSH    Other orders  -     montelukast (Singulair) 10 MG tablet; Take 1 tablet by mouth Every Night.  Dispense: 90 tablet; Refill: 3  -     sertraline (Zoloft) 50 MG tablet; Take 1 tablet by mouth Daily.  Dispense: 90 tablet; Refill: 3

## 2024-01-11 DIAGNOSIS — K21.9 GASTROESOPHAGEAL REFLUX DISEASE: ICD-10-CM

## 2024-01-12 ENCOUNTER — TELEPHONE (OUTPATIENT)
Dept: FAMILY MEDICINE CLINIC | Facility: CLINIC | Age: 64
End: 2024-01-12
Payer: COMMERCIAL

## 2024-01-12 NOTE — TELEPHONE ENCOUNTER
Caller: Cheri Cross    Relationship: Self    Best call back number:[phone]     What test/procedure requested:      When is it needed:      Where is the test/procedure going to be performed:      Additional information or concerns: PATIENT CALLED STATED THAT [facility] WOULD ONLY DO 90 DAYS SUPPLY FOR WHOLE YEAR.  SHE THEN CALLED HER [employer] INSURANCE WHICH STATED FOR THE MEDICATION omeprazole (priLOSEC) 40 MG capsule  WILL NEED PRIOR AUTH AND SHE WILL BE FAXING PRIOR AUTH REQUEST TO PROVIDER OFFICE TO COMPLETE.  PLEASE LET PATIENT KNOW WHEN YOU RECEIVE THE PRIOR AUTH PAPERWORK FOR THIS MEDICATION.

## 2024-01-13 ENCOUNTER — PRIOR AUTHORIZATION (OUTPATIENT)
Dept: FAMILY MEDICINE CLINIC | Facility: CLINIC | Age: 64
End: 2024-01-13
Payer: COMMERCIAL

## 2024-01-13 NOTE — TELEPHONE ENCOUNTER
CHICO GARCIA (Key: [id number]) - [id number]  Omeprazole 40MG dr capsules  Status: PA Response - ApprovedCreated: January 12th, 2024Sent: January 13th, 2024  Open  Archive

## 2024-01-15 RX ORDER — OMEPRAZOLE 40 MG/1
40 CAPSULE, DELAYED RELEASE ORAL DAILY
Qty: 90 CAPSULE | Refills: 3 | Status: SHIPPED | OUTPATIENT
Start: 2024-01-15

## 2024-08-26 ENCOUNTER — OFFICE VISIT (OUTPATIENT)
Dept: FAMILY MEDICINE CLINIC | Facility: CLINIC | Age: 64
End: 2024-08-26
Payer: COMMERCIAL

## 2024-08-26 VITALS
SYSTOLIC BLOOD PRESSURE: 110 MMHG | DIASTOLIC BLOOD PRESSURE: 70 MMHG | BODY MASS INDEX: 24.04 KG/M2 | HEART RATE: 69 BPM | HEIGHT: 66 IN | WEIGHT: 149.6 LBS | TEMPERATURE: 97.6 F | OXYGEN SATURATION: 98 %

## 2024-08-26 DIAGNOSIS — L71.9 ACNE ROSACEA: ICD-10-CM

## 2024-08-26 DIAGNOSIS — M25.561 ACUTE PAIN OF RIGHT KNEE: Primary | ICD-10-CM

## 2024-08-26 DIAGNOSIS — J45.30 MILD PERSISTENT ASTHMA WITHOUT COMPLICATION: ICD-10-CM

## 2024-08-26 DIAGNOSIS — R60.9 EDEMA, UNSPECIFIED TYPE: ICD-10-CM

## 2024-08-26 DIAGNOSIS — K21.9 GASTROESOPHAGEAL REFLUX DISEASE: ICD-10-CM

## 2024-08-26 PROCEDURE — 99214 OFFICE O/P EST MOD 30 MIN: CPT | Performed by: NURSE PRACTITIONER

## 2024-08-26 RX ORDER — ALBUTEROL SULFATE 90 UG/1
2 AEROSOL, METERED RESPIRATORY (INHALATION) EVERY 4 HOURS PRN
Qty: 1 G | Refills: 11 | Status: SHIPPED | OUTPATIENT
Start: 2024-08-26

## 2024-08-26 RX ORDER — FUROSEMIDE 20 MG
20 TABLET ORAL DAILY
Qty: 90 TABLET | Refills: 3 | Status: SHIPPED | OUTPATIENT
Start: 2024-08-26

## 2024-08-26 RX ORDER — FLUTICASONE PROPIONATE AND SALMETEROL 100; 50 UG/1; UG/1
1 POWDER RESPIRATORY (INHALATION)
Qty: 60 EACH | Refills: 5 | Status: SHIPPED | OUTPATIENT
Start: 2024-08-26

## 2024-08-26 RX ORDER — OMEPRAZOLE 40 MG/1
40 CAPSULE, DELAYED RELEASE ORAL DAILY
Qty: 90 CAPSULE | Refills: 3 | Status: SHIPPED | OUTPATIENT
Start: 2024-08-26

## 2024-08-26 RX ORDER — DOXYCYCLINE 100 MG/1
100 TABLET ORAL 2 TIMES DAILY
Qty: 180 TABLET | Refills: 3 | Status: SHIPPED | OUTPATIENT
Start: 2024-08-26

## 2024-08-26 NOTE — PROGRESS NOTES
Chief Complaint  Knee Injury, Heartburn, Rosacea, and Peripheral edema    Subjective        Heartburn  She reports no abdominal pain, no chest pain, no coughing, no nausea or no wheezing. Pertinent negatives include no fatigue.   Rosacea  Associated symptoms include arthralgias. Pertinent negatives include no abdominal pain, chest pain, chills, coughing, fatigue, fever, headaches, nausea or vomiting.      Cheri presents to [facility] as a 64-year-old female for follow-up on chronic conditions, to refill medications, review/discuss labs and to discuss some right knee pain over the past week      Asthma has been well-controlled.  She is currently taking albuterol as needed, and Advair daily.  She is no longer taking her montelukast.  She feels these are working well for her.  She does have increased symptoms with season changes.  Denies any medication side effects.    Peripheral edema has been well-controlled on Lasix.  She takes her medication as directed.  Denies any medication side effects.  Last labs were 9/2023.    She is not fasting today and will return for labs    GERD-takes omeprazole.  Tries to avoid triggers.    Rosacea-uses doxycycline.  Feels this works well for her.  No longer using MetroCream  No changes in signs and symptoms    Anxiety/depression.  Takes sertraline.  Hydroxyzine as needed.  Feels this controls her symptoms well denies any need for changes in medication.  Not currently in counseling.  Denies any SI or HI.      Right knee pain post fall 1 week ago.  States that she did fall on her right elbow and right knee.  Denies any other injury.  States initially it was slightly bruised and swollen.  Swelling has decreased slightly over the last week.  She has been elevating and icing the area.   to the touch.  She is able to walk and bear weight.  Pain is worse with any bending lifting or twisting.  Intermittently 2-5 out of 10.  Has been taking  "over-the-counter Tylenol/Motrin  Denies any previous injury    No other acute C/o today    The following portions of the patient's history were reviewed and updated as appropriate: allergies, current medications, past family history, past medical history, past social history, past surgical history, and problem list                  Review of Systems   Constitutional:  Negative for chills, fatigue and fever.   Eyes:  Negative for visual disturbance.   Respiratory:  Negative for cough, shortness of breath and wheezing.    Cardiovascular:  Negative for chest pain and leg swelling.   Gastrointestinal:  Negative for abdominal pain, diarrhea, nausea and vomiting.   Musculoskeletal:  Positive for arthralgias.   Neurological:  Negative for dizziness.   Psychiatric/Behavioral:  Negative for self-injury, sleep disturbance and suicidal ideas. The patient is not nervous/anxious.         Objective   Vital Signs:   Vitals:    08/26/24 0928   BP: 110/70   Pulse: 69   Temp: 97.6 °F (36.4 °C)   SpO2: 98%   Weight: 67.9 kg (149 lb 9.6 oz)   Height: 167.6 cm (65.98\")   PainSc:   4   PainLoc: Knee            8/26/2024     9:30 AM   PHQ-2/PHQ-9 Depression Screening   Little Interest or Pleasure in Doing Things 0-->not at all   Feeling Down, Depressed or Hopeless 0-->not at all   PHQ-9: Brief Depression Severity Measure Score 0       BMI is within normal parameters. No other follow-up for BMI required.        Physical Exam  Vitals reviewed.   Constitutional:       General: She is not in acute distress.  Eyes:      Conjunctiva/sclera: Conjunctivae normal.   Neck:      Thyroid: No thyromegaly.      Vascular: No carotid bruit.   Cardiovascular:      Rate and Rhythm: Normal rate and regular rhythm.      Heart sounds: Normal heart sounds. No murmur heard.  Pulmonary:      Effort: Pulmonary effort is normal. No respiratory distress.      Breath sounds: Normal breath sounds. No stridor. No wheezing, rhonchi or rales.   Chest:      Chest wall: " No tenderness.   Musculoskeletal:      Right knee: Swelling present. No deformity, effusion, erythema, ecchymosis, lacerations, bony tenderness or crepitus. Normal range of motion. Tenderness present over the patellar tendon.   Lymphadenopathy:      Cervical: No cervical adenopathy.   Neurological:      Mental Status: She is alert and oriented to person, place, and time.   Psychiatric:         Attention and Perception: Attention normal.         Mood and Affect: Mood normal.          Result Review :     The following data was reviewed by: JOSHUA Lemus on 08/26/2024:           Assessment and Plan    Assessment & Plan  Acute pain of right knee  Xray ordered today  Will refer to ortho with any worsening or continue pain    RICE  Mild persistent asthma without complication  Asthma is stable.  The patient is experiencing monthly daytime asthma symptoms and she is experiencing monthly nighttime asthma symptoms.    Plan: Continue same medication/s without change.    Discussed medication dosage, use, side effects, and goals of treatment in detail.    Discussed distinction between quick-relief and maintenance control medications.  Discussed monitoring symptoms and use of quick-relief medications and contacting provider early in the course of exacerbations.  Warning signs of respiratory distress were reviewed with the patient.     Patient Treatment Goals: symptom prevention, minimizing limitation in activity, prevention of exacerbations and use of ER/inpatient care, maintenance of optimal pulmonary function, and minimization of adverse effects of treatment.    Followup in 6 months.          Edema, unspecified type  Controlled-  continue Lasix  Gastroesophageal reflux disease  Avoid triggers including caffeine, nicotine, alcohol, spicy foods, red sauce     Acne rosacea  Stable-  continue current management      Orders Placed This Encounter   Procedures    XR Knee 1 or 2 View Right    Comprehensive metabolic panel     Lipid panel    TSH    T4, Free    CBC and Differential     New Medications Ordered This Visit   Medications    albuterol sulfate  (90 Base) MCG/ACT inhaler     Sig: Inhale 2 puffs Every 4 (Four) Hours As Needed for Wheezing.     Dispense:  1 g     Refill:  11     proair inhaler    furosemide (LASIX) 20 MG tablet     Sig: Take 1 tablet by mouth Daily.     Dispense:  90 tablet     Refill:  3    Fluticasone-Salmeterol (ADVAIR/WIXELA) 100-50 MCG/ACT DISKUS     Sig: Inhale 1 puff 2 (Two) Times a Day.     Dispense:  60 each     Refill:  5    sertraline (Zoloft) 50 MG tablet     Sig: Take 1 tablet by mouth Daily.     Dispense:  90 tablet     Refill:  3    omeprazole (priLOSEC) 40 MG capsule     Sig: Take 1 capsule by mouth Daily.     Dispense:  90 capsule     Refill:  3    doxycycline (ADOXA) 100 MG tablet     Sig: Take 1 tablet by mouth 2 (Two) Times a Day.     Dispense:  180 tablet     Refill:  3      Labs ordered -  will return prior to annual      Follow Up   Return if symptoms worsen or fail to improve, for Annual physical, Follow up with PCP as Directed.  Patient was given instructions and counseling regarding her condition or for health maintenance advice. Please see specific information pulled into the AVS if appropriate.

## 2024-08-26 NOTE — ASSESSMENT & PLAN NOTE
Asthma is stable.  The patient is experiencing monthly daytime asthma symptoms and she is experiencing monthly nighttime asthma symptoms.    Plan: Continue same medication/s without change.    Discussed medication dosage, use, side effects, and goals of treatment in detail.    Discussed distinction between quick-relief and maintenance control medications.  Discussed monitoring symptoms and use of quick-relief medications and contacting provider early in the course of exacerbations.  Warning signs of respiratory distress were reviewed with the patient.     Patient Treatment Goals: symptom prevention, minimizing limitation in activity, prevention of exacerbations and use of ER/inpatient care, maintenance of optimal pulmonary function, and minimization of adverse effects of treatment.    Followup in 6 months.

## 2024-09-11 DIAGNOSIS — E78.2 MIXED HYPERLIPIDEMIA: ICD-10-CM

## 2024-09-13 RX ORDER — PRAVASTATIN SODIUM 40 MG
40 TABLET ORAL DAILY
Qty: 90 TABLET | Refills: 0 | Status: SHIPPED | OUTPATIENT
Start: 2024-09-13

## 2024-09-13 NOTE — TELEPHONE ENCOUNTER
Last visit: 8-26-24  Next visit: eturn if symptoms worsen or fail to improve, for Annual physical, Follow up with PCP as Directed.

## 2024-12-14 DIAGNOSIS — E78.2 MIXED HYPERLIPIDEMIA: ICD-10-CM

## 2024-12-16 RX ORDER — PRAVASTATIN SODIUM 40 MG
40 TABLET ORAL DAILY
Qty: 14 TABLET | Refills: 0 | Status: SHIPPED | OUTPATIENT
Start: 2024-12-16

## 2025-03-19 DIAGNOSIS — E78.2 MIXED HYPERLIPIDEMIA: ICD-10-CM

## 2025-03-21 DIAGNOSIS — E78.2 MIXED HYPERLIPIDEMIA: ICD-10-CM

## 2025-03-21 RX ORDER — PRAVASTATIN SODIUM 40 MG
40 TABLET ORAL DAILY
Qty: 30 TABLET | Refills: 0 | Status: SHIPPED | OUTPATIENT
Start: 2025-03-21

## 2025-03-21 RX ORDER — PRAVASTATIN SODIUM 40 MG
40 TABLET ORAL DAILY
Qty: 90 TABLET | OUTPATIENT
Start: 2025-03-21

## 2025-05-21 DIAGNOSIS — R60.9 EDEMA, UNSPECIFIED TYPE: ICD-10-CM

## 2025-05-21 NOTE — TELEPHONE ENCOUNTER
Rx Refill Note  Requested Prescriptions     Pending Prescriptions Disp Refills    furosemide (LASIX) 20 MG tablet [Pharmacy Med Name: FUROSEMIDE 20MG TABLETS] 90 tablet 3     Sig: TAKE 1 TABLET BY MOUTH DAILY      Last office visit with prescribing clinician: 8/26/2024   Last telemedicine visit with prescribing clinician: Visit date not found   Next office visit with prescribing clinician: Visit date not found     Diuretics Protocol [id number]/2025 09:51 AM   Protocol Details Normal serum potassium in past 12 months    Normal serum sodium in past 12 months    GFR> 30 ml/min in past year       ALTA Barragan(R)  05/21/25, 11:07 EDT

## 2025-05-22 RX ORDER — FUROSEMIDE 20 MG/1
20 TABLET ORAL DAILY
Qty: 90 TABLET | Refills: 0 | Status: SHIPPED | OUTPATIENT
Start: 2025-05-22

## 2025-08-21 DIAGNOSIS — R60.9 EDEMA, UNSPECIFIED TYPE: ICD-10-CM

## 2025-08-21 RX ORDER — FUROSEMIDE 20 MG/1
20 TABLET ORAL DAILY
Qty: 30 TABLET | Refills: 0 | Status: SHIPPED | OUTPATIENT
Start: 2025-08-21

## (undated) DEVICE — KT ORCA ORCAPOD DISP STRL

## (undated) DEVICE — TUBING, SUCTION, 1/4" X 10', STRAIGHT: Brand: MEDLINE

## (undated) DEVICE — ADAPT CLN BIOGUARD AIR/H2O DISP

## (undated) DEVICE — CANN O2 ETCO2 FITS ALL CONN CO2 SMPL A/ 7IN DISP LF

## (undated) DEVICE — LN SMPL CO2 SHTRM SD STREAM W/M LUER

## (undated) DEVICE — SENSR O2 OXIMAX FNGR A/ 18IN NONSTR